# Patient Record
Sex: MALE | Employment: FULL TIME | URBAN - METROPOLITAN AREA
[De-identification: names, ages, dates, MRNs, and addresses within clinical notes are randomized per-mention and may not be internally consistent; named-entity substitution may affect disease eponyms.]

---

## 2017-05-21 ENCOUNTER — COMMUNICATION - HEALTHEAST (OUTPATIENT)
Dept: INTERNAL MEDICINE | Facility: CLINIC | Age: 52
End: 2017-05-21

## 2017-05-29 ENCOUNTER — COMMUNICATION - HEALTHEAST (OUTPATIENT)
Dept: INTERNAL MEDICINE | Facility: CLINIC | Age: 52
End: 2017-05-29

## 2017-08-14 ENCOUNTER — COMMUNICATION - HEALTHEAST (OUTPATIENT)
Dept: INTERNAL MEDICINE | Facility: CLINIC | Age: 52
End: 2017-08-14

## 2017-08-14 DIAGNOSIS — M10.9 GOUT: ICD-10-CM

## 2017-08-14 DIAGNOSIS — I10 HTN (HYPERTENSION): ICD-10-CM

## 2017-08-21 ENCOUNTER — COMMUNICATION - HEALTHEAST (OUTPATIENT)
Dept: INTERNAL MEDICINE | Facility: CLINIC | Age: 52
End: 2017-08-21

## 2017-09-06 ENCOUNTER — OFFICE VISIT (OUTPATIENT)
Dept: SURGERY | Facility: CLINIC | Age: 52
End: 2017-09-06

## 2017-09-06 ENCOUNTER — ALLIED HEALTH/NURSE VISIT (OUTPATIENT)
Dept: SURGERY | Facility: CLINIC | Age: 52
End: 2017-09-06

## 2017-09-06 ENCOUNTER — RECORDS - HEALTHEAST (OUTPATIENT)
Dept: ADMINISTRATIVE | Facility: OTHER | Age: 52
End: 2017-09-06

## 2017-09-06 VITALS
DIASTOLIC BLOOD PRESSURE: 69 MMHG | HEART RATE: 58 BPM | OXYGEN SATURATION: 100 % | BODY MASS INDEX: 36.32 KG/M2 | WEIGHT: 218 LBS | SYSTOLIC BLOOD PRESSURE: 125 MMHG | TEMPERATURE: 98 F | HEIGHT: 65 IN

## 2017-09-06 DIAGNOSIS — Z98.84 BARIATRIC SURGERY STATUS: Primary | ICD-10-CM

## 2017-09-06 ASSESSMENT — PAIN SCALES - GENERAL: PAINLEVEL: NO PAIN (0)

## 2017-09-06 NOTE — MR AVS SNAPSHOT
"              After Visit Summary   9/6/2017    Alvin Fitzpatrick    MRN: [id number]           Patient Information     Date Of Birth          1965        Visit Information        Provider Department      9/6/2017 1:15 PM Kiran Laird MD [facility] Surgical Weight Management        Today's Diagnoses     Bariatric surgery status    -  1       Follow-ups after your visit        Who to contact     Please call your clinic at [phone] to:    Ask questions about your health    Make or cancel appointments    Discuss your medicines    Learn about your test results    Speak to your doctor   If you have compliments or concerns about an experience at your clinic, or if you wish to file a complaint, please contact [facility] Patient Relations at [phone] or email us at [email or web address]         Additional Information About Your Visit        MyChart Information     [employer] gives you secure access to your electronic health record. If you see a primary care provider, you can also send messages to your care team and make appointments. If you have questions, please call your primary care clinic.  If you do not have a primary care provider, please call [phone] and they will assist you.      [employer] is an electronic gateway that provides easy, online access to your medical records. With [employer], you can request a clinic appointment, read your test results, renew a prescription or communicate with your care team.     To access your existing account, please contact your [facility] or call [phone] for assistance.        Care EveryWhere ID     This is your Care EveryWhere ID. This could be used by other organizations to access your Only medical records  [id number]        Your Vitals Were     Pulse Temperature Height Pulse Oximetry BMI (Body Mass Index)       58 98  F (36.7  C) (Oral) 5' 5\" 100% 36.28 kg/m2        Blood Pressure from Last " 3 Encounters:   09/06/17 125/69   10/07/16 115/69   01/27/16 106/66    Weight from Last 3 Encounters:   09/06/17 218 lb   10/07/16 212 lb 1.6 oz   01/27/16 241 lb 6.4 oz              Today, you had the following     No orders found for display       Primary Care Provider Office Phone # Fax #    Noe Beckwith [phone]       [address] [address]        Equal Access to Services     MARILOU SANCHEZ : Hadii aad ku hadasho Soomaali, waaxda luqadaha, qaybta kaalmada adeegyada, federico gallardo . So [facility] [phone].    ATENCIÓN: Si habla español, tiene a bermudez disposición servicios gratuitos de asistencia lingüística. Ll[facility] [phone].    We comply with applicable federal civil rights laws and [place] laws. We do not discriminate on the basis of race, color, national origin, age, disability sex, sexual orientation or gender identity.            Thank you!     Thank you for choosing [facility] SURGICAL WEIGHT MANAGEMENT  for your care. Our goal is always to provide you with excellent care. Hearing back from our patients is one way we can continue to improve our services. Please take a few minutes to complete the written survey that you may receive in the mail after your visit with us. Thank you!             Your Updated Medication List - Protect others around you: Learn how to safely use, store and throw away your medicines at www.disposemymeds.org.          This list is accurate as of: 9/6/17  4:29 PM.  Always use your most recent med list.                   Brand Name Dispense Instructions for use Diagnosis    allopurinol 300 MG tablet    ZYLOPRIM     Take 300 mg by mouth daily        B-12 1000 MCG Subl           CALCIUM CITRATE + PO      Take 1,200-1,500 tablets by mouth        CELEBREX PO           Multi-vitamin Tabs tablet      Take 1 tablet by mouth daily        polyethylene glycol Packet    MIRALAX    7 packet    Take 17 g by mouth daily     Constipation       torsemide 20 MG tablet    DEMADEX     Take 20 mg by mouth daily        TYLENOL PO      Take 325 mg by mouth as needed for mild pain or fever

## 2017-09-06 NOTE — PROGRESS NOTES
Return Bariatric Surgery Note    RE: Alvin Fitzpatrick  MR#: [id number]  : 1965  VISIT DATE: Sep 6, 2017    Dear Noe Beckwith,    I had the pleasure of seeing your patient, Alvin Fitzpatrick, in my post-bariatric surgery assessment clinic.    CHIEF COMPLAINT: Post-bariatric surgery follow-up.    Alvin is a 51 y.o. Male who is now 2 years s/p sleeve gastrectomy. His highest weight was 400, lowest 199, currently 218. His weight has been largely stable over the past year ranging from 205-218. He is slightly worried that his weight has trended up, but feels that this might be from his more extensive weight lifting as of late. He has occasional constipation that he takes Miralax for. Taking vitamins     The patient is otherwise feeling well and is very pleased with his surgical results.    HISTORY OF PRESENT ILLNESS:  Questions Regarding Prior Weight Loss Surgery Reviewed With Patient 2017   I had the following weight loss procedure: Sleeve Gastrectomy   What year was your surgery?    How has your weight changed since your last visit? I have stayed about the same   Are you currently taking any weight loss medications? No   Do you currently have any of the following: Sleep Apnea   Have you been to the Emergency room since your last visit with us? No   Were you in the hospital since your last visit with us? No   Do you have any concerns today? none       Weight History:     2017   What is your highest lifetime weight? 406   What is your lowest weight since surgery? (In pounds) 199        Current Weight: Weight: 218 lb          Questions Regarding Co-Morbidities and Health Concerns Reviewed With Patient 2017   Pre-diabetes: Gone away   Diabetes II: Never   High Blood Pressure: Gone Away   High cholesterol: Gone away   Heartburn/Reflux: Never   Sleep apnea: Improved   Do you use a CPAP? Yes   PCOS: Never   Back pain: Improved   Joint pain: Improved   Lower leg swelling: Improved       Eating  "Habits 8/28/2017   How many meals do you eat per day? 3   Do you snack between meals? Sometimes   How much food are you eating at each meal? 1/2 cup to 1 cup   Are you able to separate your meals and liquids by at least 30 minutes? Yes   Are you able to avoid liquid calories? Yes       Exercise Questions Reviewed With Patient 8/28/2017   How often do you exercise? 3 to 4 times per week   What is the duration of your exercise (in minutes)? 60+ Minutes   What types of exercise do you do? walking, swimming, other   What keeps you from being more active?  I am as active as I can possbily be, Lack of Time       Social History:      8/28/2017   Are you smoking? No   Are you drinking alcohol? No       Medications:  Current Outpatient Prescriptions   Medication     Celecoxib (CELEBREX PO)     multivitamin, therapeutic with minerals (MULTI-VITAMIN) TABS     Calcium Citrate-Vitamin D (CALCIUM CITRATE + PO)     Cyanocobalamin (B-12) 1000 MCG SUBL     allopurinol (ZYLOPRIM) 300 MG tablet     torsemide (DEMADEX) 20 MG tablet     Acetaminophen (TYLENOL PO)     polyethylene glycol (MIRALAX) packet     No current facility-administered medications for this visit.          8/28/2017   Do you avoid NSAIDs such as (Ibuprofen, Aleve, Naproxen, Advil)?   No       ROS:  GI:      8/28/2017   Vomiting: No   Diarrhea: No   Constipation: Yes   Swallowing trouble: No   Abdominal pain: No   Heartburn: No   Rash in skin folds: No   Depression: No   Stress urinary incontinence No     Skin:   BAR RBS ROS - SKIN 8/28/2017   Rash in skin folds: No     Psych:      8/28/2017   Depression: No   Anxiety: No       PHYSICAL EXAMINATION:  /69 (BP Location: Left arm, Patient Position: Chair, Cuff Size: Adult Large)  Pulse 58  Temp 98  F (36.7  C) (Oral)  Ht 5' 5\"  Wt 218 lb  SpO2 100%  BMI 36.28 kg/m2   Gen - sitting upright in chair, pleasant.  Lungs - breathing comfortably on room air  Abd - non-distended. Soft, nt/nd. Small reducible " periumbilical hernia.   Neuro - moving all extremities spontaneously. No focal deficits.     ASSESSMENT AND PLAN:    1. 2 years status post laparoscopic gastric sleeve. Current weight 218lbs down from 400. He has been stable at that weight for several months. Advised patient to continue dietary habits and be mindful of stress management. Dietician saw the patient to discuss fiber. Continue Miralax prn for constipation.    2. Morbid Obesity, current BMI: Body mass index is 36.28 kg/(m^2).  3. Post surgical malabsorption:   Labs at upcoming PCP visit    Follow food plan per dietitian recommendations.   Continue taking recommended post-op vitamins.  4. Umbilical hernia, no need for repair at this time. Discussed with patient that if it begins causing pain or becomes non-reducible, he should present to the ED.   5. Return to clinic in 1 year.      Patient seen and examined and history reviewed.  Doing well after sleeve gastrectomy and is free of complications.  Please see above which reflects the history and physical examination.

## 2017-09-06 NOTE — NURSING NOTE
"Chief Complaint   Patient presents with     Clinic Care Coordination - Follow-up     2 year follow up Bariatric.        Vitals:    09/06/17 1258   BP: 125/69   BP Location: Left arm   Patient Position: Chair   Cuff Size: Adult Large   Pulse: 58   Temp: 98  F (36.7  C)   TempSrc: Oral   SpO2: 100%   Weight: 218 lb   Height: 5' 5\"       Body mass index is 36.28 kg/(m^2).  Dina WANG, OVIDION               "

## 2017-09-06 NOTE — LETTER
2017     RE: Alvin Fitzpatrick  [address] [facility] [address]     Dear Colleague,    Thank you for referring your patient, Alvin Fitzpatrick, to the [facility] SURGICAL WEIGHT MANAGEMENT at [facility]. Please see a copy of my visit note below.        Return Bariatric Surgery Note    RE: Alvin Fitzpatrick  MR#: [id number]  : 1965  VISIT DATE: Sep 6, 2017    Dear Noe Beckwith,    I had the pleasure of seeing your patient, Alvin Fitzpatrick, in my post-bariatric surgery assessment clinic.    CHIEF COMPLAINT: Post-bariatric surgery follow-up.    Alvin is a 51 y.o. Male who is now 2 years s/p sleeve gastrectomy. His highest weight was 400, lowest 199, currently 218. His weight has been largely stable over the past year ranging from 205-218. He is slightly worried that his weight has trended up, but feels that this might be from his more extensive weight lifting as of late. He has occasional constipation that he takes Miralax for. Taking vitamins     The patient is otherwise feeling well and is very pleased with his surgical results.    HISTORY OF PRESENT ILLNESS:  Questions Regarding Prior Weight Loss Surgery Reviewed With Patient 2017   I had the following weight loss procedure: Sleeve Gastrectomy   What year was your surgery? 2015   How has your weight changed since your last visit? I have stayed about the same   Are you currently taking any weight loss medications? No   Do you currently have any of the following: Sleep Apnea   Have you been to the Emergency room since your last visit with us? No   Were you in the hospital since your last visit with us? No   Do you have any concerns today? none       Weight History:     2017   What is your highest lifetime weight? 406   What is your lowest weight since surgery? (In pounds) 199        Current Weight: Weight: 218 lb          Questions Regarding Co-Morbidities and Health Concerns Reviewed With Patient 2017    Pre-diabetes: Gone away   Diabetes II: Never   High Blood Pressure: Gone Away   High cholesterol: Gone away   Heartburn/Reflux: Never   Sleep apnea: Improved   Do you use a CPAP? Yes   PCOS: Never   Back pain: Improved   Joint pain: Improved   Lower leg swelling: Improved       Eating Habits 8/28/2017   How many meals do you eat per day? 3   Do you snack between meals? Sometimes   How much food are you eating at each meal? 1/2 cup to 1 cup   Are you able to separate your meals and liquids by at least 30 minutes? Yes   Are you able to avoid liquid calories? Yes       Exercise Questions Reviewed With Patient 8/28/2017   How often do you exercise? 3 to 4 times per week   What is the duration of your exercise (in minutes)? 60+ Minutes   What types of exercise do you do? walking, swimming, other   What keeps you from being more active?  I am as active as I can possbily be, Lack of Time       Social History:      8/28/2017   Are you smoking? No   Are you drinking alcohol? No       Medications:  Current Outpatient Prescriptions   Medication     Celecoxib (CELEBREX PO)     multivitamin, therapeutic with minerals (MULTI-VITAMIN) TABS     Calcium Citrate-Vitamin D (CALCIUM CITRATE + PO)     Cyanocobalamin (B-12) 1000 MCG SUBL     allopurinol (ZYLOPRIM) 300 MG tablet     torsemide (DEMADEX) 20 MG tablet     Acetaminophen (TYLENOL PO)     polyethylene glycol (MIRALAX) packet     No current facility-administered medications for this visit.          8/28/2017   Do you avoid NSAIDs such as (Ibuprofen, Aleve, Naproxen, Advil)?   No       ROS:  GI:      8/28/2017   Vomiting: No   Diarrhea: No   Constipation: Yes   Swallowing trouble: No   Abdominal pain: No   Heartburn: No   Rash in skin folds: No   Depression: No   Stress urinary incontinence No     Skin:   BAR RBS ROS - SKIN 8/28/2017   Rash in skin folds: No     Psych:      8/28/2017   Depression: No   Anxiety: No       PHYSICAL EXAMINATION:  /69 (BP Location: Left arm,  "Patient Position: Chair, Cuff Size: Adult Large)  Pulse 58  Temp 98  F (36.7  C) (Oral)  Ht 5' 5\"  Wt 218 lb  SpO2 100%  BMI 36.28 kg/m2   Gen - sitting upright in chair, pleasant.  Lungs - breathing comfortably on room air  Abd - non-distended. Soft, nt/nd. Small reducible periumbilical hernia.   Neuro - moving all extremities spontaneously. No focal deficits.     ASSESSMENT AND PLAN:    1. 2 years status post laparoscopic gastric sleeve. Current weight 218lbs down from 400. He has been stable at that weight for several months. Advised patient to continue dietary habits and be mindful of stress management. Dietician saw the patient to discuss fiber. Continue Miralax prn for constipation.    2. Morbid Obesity, current BMI: Body mass index is 36.28 kg/(m^2).  3. Post surgical malabsorption:   Labs at upcoming PCP visit    Follow food plan per dietitian recommendations.   Continue taking recommended post-op vitamins.  4. Umbilical hernia, no need for repair at this time. Discussed with patient that if it begins causing pain or becomes non-reducible, he should present to the ED.   5. Return to clinic in 1 year.    Patient seen and examined and history reviewed.  Doing well after sleeve gastrectomy and is free of complications.  Please see above which reflects the history and physical examination.    Again, thank you for allowing me to participate in the care of your patient.      Sincerely,    Kiran Laird MD    "

## 2017-09-06 NOTE — PROGRESS NOTES
Nutrition Assessment  Reason For Visit:  Alvin Fitzpatrick is a 51 year-old male presenting today for nutrition follow-up, 2 years s/p SG with Dr. Laird.  Pt was referred by Dr. Ahmadi (9/6/17).     Anthropometrics:  Pre-op Weight: 340.9 lbs  Current Weight: 218 lbs with BMI of 36.28  Weight loss: 122.9 lbs    Current Vitamins/Minerals: MVI/minerals BID, 500 mg Calcium Citrate + Vitamin D TID, Vitamin B12 sublingual daily    Nutrition History:  Pt c/o constipation.  Pt is following all diet guidelines post-op SG very well per his report and diet recall today.     Physical Activity: walking and strengthening exercises daily (time variable); utilizing a  here and there for guidance    Nutrition Prescription:  Grams Protein: 60 (minimum)  Amount of Fluid: 48-64 oz    Nutrition Diagnosis  Food and nutrition-related knowledge deficit related to no history of education for prevention of infrequent bowel movements as evidenced by Pt seeking advice on improving bowel movement regularity.    Intervention  Intervention At Appointment:  Materials/Education provided on improving bowel movement regularity through adequate fiber, hydration, movement, and probiotics. Provided Pt with list of goals.     Goals:  1. Continue to focus on lean protein and non-starchy veg/whole fruit at meals.   2. Be sure to drink at least 64 oz of non-calorie fluids between meals.   3. Try a daily probiotic ([email or web address]).   4. Increase movement throughout the day.     Follow-Up: PRN    Time spent with patient: 30 minutes.  Niki Rebollar, MS, RDN, LDN, CLT  Pager: [phone]

## 2017-09-06 NOTE — MR AVS SNAPSHOT
MRN:[id number]                      After Visit Summary   9/6/2017    Alvin Fitzpatrick    MRN: [id number]           Visit Information        Provider Department      9/6/2017 1:45 PM Niki Rebollar RD M [facility] Surgical Weight Management        [employer] Information     [employer] gives you secure access to your electronic health record. If you see a primary care provider, you can also send messages to your care team and make appointments. If you have questions, please call your primary care clinic.  If you do not have a primary care provider, please call [phone] and they will assist you.      [employer] is an electronic gateway that provides easy, online access to your medical records. With [employer], you can request a clinic appointment, read your test results, renew a prescription or communicate with your care team.     To access your existing account, please contact your [facility] or call [phone] for assistance.        Care EveryWhere ID     This is your Care EveryWhere ID. This could be used by other organizations to access your [place] medical records  [id number]        Equal Access to Services     MARILOU SANCHEZ : Hadii zeeshan abreu hadasho Soluzali, waaxda luqadaha, qaybta kaalmada adegeronimo, federico bean. So [facility] [phone].    ATENCIÓN: Si habla español, tiene a bermudez disposición servicios gratuitos de asistencia lingüística. Llame al [phone].    We comply with applicable federal civil rights laws and [place] laws. We do not discriminate on the basis of race, color, national origin, age, disability sex, sexual orientation or gender identity.

## 2017-11-06 ENCOUNTER — COMMUNICATION - HEALTHEAST (OUTPATIENT)
Dept: INTERNAL MEDICINE | Facility: CLINIC | Age: 52
End: 2017-11-06

## 2017-11-06 ENCOUNTER — OFFICE VISIT - HEALTHEAST (OUTPATIENT)
Dept: INTERNAL MEDICINE | Facility: CLINIC | Age: 52
End: 2017-11-06

## 2017-11-06 DIAGNOSIS — M25.511 RIGHT SHOULDER PAIN: ICD-10-CM

## 2017-11-06 DIAGNOSIS — M16.11 OSTEOARTHRITIS OF RIGHT HIP: ICD-10-CM

## 2017-11-06 DIAGNOSIS — I87.2 VENOUS INSUFFICIENCY: ICD-10-CM

## 2017-11-06 DIAGNOSIS — Z98.84 BARIATRIC SURGERY STATUS: ICD-10-CM

## 2017-11-06 DIAGNOSIS — Z00.00 ROUTINE PHYSICAL EXAMINATION: ICD-10-CM

## 2017-11-06 DIAGNOSIS — Z12.11 SCREEN FOR COLON CANCER: ICD-10-CM

## 2017-11-06 DIAGNOSIS — M75.41 SHOULDER IMPINGEMENT SYNDROME, RIGHT: ICD-10-CM

## 2017-11-06 DIAGNOSIS — I10 HTN (HYPERTENSION): ICD-10-CM

## 2017-11-06 DIAGNOSIS — G47.33 OSA ON CPAP: ICD-10-CM

## 2017-11-06 DIAGNOSIS — M10.9 GOUT: ICD-10-CM

## 2017-11-06 DIAGNOSIS — Z23 NEED FOR INFLUENZA VACCINATION: ICD-10-CM

## 2017-11-06 DIAGNOSIS — M25.551 RIGHT HIP PAIN: ICD-10-CM

## 2017-11-06 ASSESSMENT — MIFFLIN-ST. JEOR: SCORE: 1799.59

## 2017-12-03 ENCOUNTER — COMMUNICATION - HEALTHEAST (OUTPATIENT)
Dept: INTERNAL MEDICINE | Facility: CLINIC | Age: 52
End: 2017-12-03

## 2017-12-03 DIAGNOSIS — M16.9 OSTEOARTHRITIS OF HIP: ICD-10-CM

## 2017-12-29 ENCOUNTER — TRANSFERRED RECORDS (OUTPATIENT)
Dept: HEALTH INFORMATION MANAGEMENT | Facility: CLINIC | Age: 52
End: 2017-12-29

## 2018-09-13 ENCOUNTER — COMMUNICATION - HEALTHEAST (OUTPATIENT)
Dept: INTERNAL MEDICINE | Facility: CLINIC | Age: 53
End: 2018-09-13

## 2018-11-20 ENCOUNTER — COMMUNICATION - HEALTHEAST (OUTPATIENT)
Dept: INTERNAL MEDICINE | Facility: CLINIC | Age: 53
End: 2018-11-20

## 2018-11-20 DIAGNOSIS — I10 HTN (HYPERTENSION): ICD-10-CM

## 2018-11-20 DIAGNOSIS — M10.9 GOUT: ICD-10-CM

## 2018-12-20 ENCOUNTER — COMMUNICATION - HEALTHEAST (OUTPATIENT)
Dept: INTERNAL MEDICINE | Facility: CLINIC | Age: 53
End: 2018-12-20

## 2018-12-20 DIAGNOSIS — M16.9 OSTEOARTHRITIS OF HIP: ICD-10-CM

## 2019-01-11 ENCOUNTER — RECORDS - HEALTHEAST (OUTPATIENT)
Dept: ADMINISTRATIVE | Facility: OTHER | Age: 54
End: 2019-01-11

## 2019-01-11 ENCOUNTER — OFFICE VISIT - HEALTHEAST (OUTPATIENT)
Dept: INTERNAL MEDICINE | Facility: CLINIC | Age: 54
End: 2019-01-11

## 2019-01-11 DIAGNOSIS — Z98.84 BARIATRIC SURGERY STATUS: ICD-10-CM

## 2019-01-11 DIAGNOSIS — Z79.1 NSAID LONG-TERM USE: ICD-10-CM

## 2019-01-11 DIAGNOSIS — Z12.11 SCREEN FOR COLON CANCER: ICD-10-CM

## 2019-01-11 DIAGNOSIS — M16.11 PRIMARY OSTEOARTHRITIS OF RIGHT HIP: ICD-10-CM

## 2019-01-11 DIAGNOSIS — G47.33 OSA ON CPAP: ICD-10-CM

## 2019-01-11 DIAGNOSIS — M1A.09X0 CHRONIC GOUT OF MULTIPLE SITES, UNSPECIFIED CAUSE: ICD-10-CM

## 2019-01-11 DIAGNOSIS — Z87.891 FORMER SMOKER: ICD-10-CM

## 2019-01-11 DIAGNOSIS — I87.2 VENOUS INSUFFICIENCY: ICD-10-CM

## 2019-01-11 DIAGNOSIS — Z00.00 ROUTINE PHYSICAL EXAMINATION: ICD-10-CM

## 2019-01-11 DIAGNOSIS — R01.1 HEART MURMUR: ICD-10-CM

## 2019-01-11 DIAGNOSIS — I10 ESSENTIAL HYPERTENSION: ICD-10-CM

## 2019-01-11 DIAGNOSIS — E66.01 MORBID OBESITY (H): ICD-10-CM

## 2019-01-11 LAB
ALBUMIN SERPL-MCNC: 4 G/DL (ref 3.5–5)
ALP SERPL-CCNC: 79 U/L (ref 45–120)
ALT SERPL W P-5'-P-CCNC: 13 U/L (ref 0–45)
ANION GAP SERPL CALCULATED.3IONS-SCNC: 13 MMOL/L (ref 5–18)
AST SERPL W P-5'-P-CCNC: 22 U/L (ref 0–40)
BILIRUB SERPL-MCNC: 1 MG/DL (ref 0–1)
BNP SERPL-MCNC: 14 PG/ML (ref 0–43)
BUN SERPL-MCNC: 15 MG/DL (ref 8–22)
CALCIUM SERPL-MCNC: 9.6 MG/DL (ref 8.5–10.5)
CHLORIDE BLD-SCNC: 98 MMOL/L (ref 98–107)
CO2 SERPL-SCNC: 30 MMOL/L (ref 22–31)
CREAT SERPL-MCNC: 0.88 MG/DL (ref 0.7–1.3)
ERYTHROCYTE [DISTWIDTH] IN BLOOD BY AUTOMATED COUNT: 11 % (ref 11–14.5)
FERRITIN SERPL-MCNC: 481 NG/ML (ref 27–300)
GFR SERPL CREATININE-BSD FRML MDRD: >60 ML/MIN/1.73M2
GLUCOSE BLD-MCNC: 87 MG/DL (ref 70–125)
HCT VFR BLD AUTO: 43.8 % (ref 40–54)
HGB BLD-MCNC: 14.5 G/DL (ref 14–18)
IRON SATN MFR SERPL: 59 % (ref 20–50)
IRON SERPL-MCNC: 185 UG/DL (ref 42–175)
MCH RBC QN AUTO: 33.5 PG (ref 27–34)
MCHC RBC AUTO-ENTMCNC: 33.2 G/DL (ref 32–36)
MCV RBC AUTO: 101 FL (ref 80–100)
PLATELET # BLD AUTO: 161 THOU/UL (ref 140–440)
PMV BLD AUTO: 7.8 FL (ref 7–10)
POTASSIUM BLD-SCNC: 4.2 MMOL/L (ref 3.5–5)
PROT SERPL-MCNC: 7.6 G/DL (ref 6–8)
RBC # BLD AUTO: 4.34 MILL/UL (ref 4.4–6.2)
SODIUM SERPL-SCNC: 141 MMOL/L (ref 136–145)
TIBC SERPL-MCNC: 313 UG/DL (ref 313–563)
TRANSFERRIN SERPL-MCNC: 250 MG/DL (ref 212–360)
URATE SERPL-MCNC: 6.4 MG/DL (ref 3–8)
VIT B12 SERPL-MCNC: >2000 PG/ML (ref 213–816)
WBC: 4.9 THOU/UL (ref 4–11)

## 2019-01-11 ASSESSMENT — MIFFLIN-ST. JEOR: SCORE: 1851.99

## 2019-02-20 ENCOUNTER — COMMUNICATION - HEALTHEAST (OUTPATIENT)
Dept: INTERNAL MEDICINE | Facility: CLINIC | Age: 54
End: 2019-02-20

## 2019-02-20 DIAGNOSIS — M10.9 GOUT: ICD-10-CM

## 2019-02-20 DIAGNOSIS — I10 HTN (HYPERTENSION): ICD-10-CM

## 2019-03-11 ENCOUNTER — RECORDS - HEALTHEAST (OUTPATIENT)
Dept: ADMINISTRATIVE | Facility: OTHER | Age: 54
End: 2019-03-11

## 2019-04-11 ENCOUNTER — COMMUNICATION - HEALTHEAST (OUTPATIENT)
Dept: INTERNAL MEDICINE | Facility: CLINIC | Age: 54
End: 2019-04-11

## 2019-04-22 ENCOUNTER — OFFICE VISIT - HEALTHEAST (OUTPATIENT)
Dept: INTERNAL MEDICINE | Facility: CLINIC | Age: 54
End: 2019-04-22

## 2019-04-22 DIAGNOSIS — M1A.09X0 CHRONIC GOUT OF MULTIPLE SITES, UNSPECIFIED CAUSE: ICD-10-CM

## 2019-04-22 DIAGNOSIS — I10 ESSENTIAL HYPERTENSION: ICD-10-CM

## 2019-04-22 DIAGNOSIS — I87.2 VENOUS INSUFFICIENCY: ICD-10-CM

## 2019-04-22 DIAGNOSIS — M16.11 PRIMARY OSTEOARTHRITIS OF RIGHT HIP: ICD-10-CM

## 2019-04-22 DIAGNOSIS — Z98.84 BARIATRIC SURGERY STATUS: ICD-10-CM

## 2019-04-22 DIAGNOSIS — Z53.20 COLONOSCOPY REFUSED: ICD-10-CM

## 2019-04-22 DIAGNOSIS — Z01.810 PREOPERATIVE CARDIOVASCULAR EXAMINATION: ICD-10-CM

## 2019-04-22 DIAGNOSIS — G47.33 OSA ON CPAP: ICD-10-CM

## 2019-04-22 DIAGNOSIS — Z79.1 NSAID LONG-TERM USE: ICD-10-CM

## 2019-04-22 DIAGNOSIS — E66.01 MORBID OBESITY (H): ICD-10-CM

## 2019-04-22 LAB
ANION GAP SERPL CALCULATED.3IONS-SCNC: 11 MMOL/L (ref 5–18)
BUN SERPL-MCNC: 21 MG/DL (ref 8–22)
CALCIUM SERPL-MCNC: 10.1 MG/DL (ref 8.5–10.5)
CHLORIDE BLD-SCNC: 100 MMOL/L (ref 98–107)
CO2 SERPL-SCNC: 30 MMOL/L (ref 22–31)
CREAT SERPL-MCNC: 0.8 MG/DL (ref 0.7–1.3)
ERYTHROCYTE [DISTWIDTH] IN BLOOD BY AUTOMATED COUNT: 11.3 % (ref 11–14.5)
GFR SERPL CREATININE-BSD FRML MDRD: >60 ML/MIN/1.73M2
GLUCOSE BLD-MCNC: 90 MG/DL (ref 70–125)
HCT VFR BLD AUTO: 44.1 % (ref 40–54)
HGB BLD-MCNC: 14.8 G/DL (ref 14–18)
MCH RBC QN AUTO: 34.3 PG (ref 27–34)
MCHC RBC AUTO-ENTMCNC: 33.5 G/DL (ref 32–36)
MCV RBC AUTO: 102 FL (ref 80–100)
PLATELET # BLD AUTO: 173 THOU/UL (ref 140–440)
PMV BLD AUTO: 8.2 FL (ref 7–10)
POTASSIUM BLD-SCNC: 4.3 MMOL/L (ref 3.5–5)
RBC # BLD AUTO: 4.31 MILL/UL (ref 4.4–6.2)
SODIUM SERPL-SCNC: 141 MMOL/L (ref 136–145)
WBC: 3.7 THOU/UL (ref 4–11)

## 2019-04-22 ASSESSMENT — MIFFLIN-ST. JEOR: SCORE: 1842.57

## 2019-04-23 ENCOUNTER — COMMUNICATION - HEALTHEAST (OUTPATIENT)
Dept: INTERNAL MEDICINE | Facility: CLINIC | Age: 54
End: 2019-04-23

## 2019-04-23 ASSESSMENT — MIFFLIN-ST. JEOR: SCORE: 1842.57

## 2019-04-25 ASSESSMENT — MIFFLIN-ST. JEOR: SCORE: 1850.51

## 2019-04-28 ENCOUNTER — COMMUNICATION - HEALTHEAST (OUTPATIENT)
Dept: INTERNAL MEDICINE | Facility: CLINIC | Age: 54
End: 2019-04-28

## 2019-04-30 ENCOUNTER — COMMUNICATION - HEALTHEAST (OUTPATIENT)
Dept: INTERNAL MEDICINE | Facility: CLINIC | Age: 54
End: 2019-04-30

## 2019-05-01 ENCOUNTER — ANESTHESIA - HEALTHEAST (OUTPATIENT)
Dept: SURGERY | Facility: CLINIC | Age: 54
End: 2019-05-01

## 2019-05-01 ENCOUNTER — SURGERY - HEALTHEAST (OUTPATIENT)
Dept: SURGERY | Facility: CLINIC | Age: 54
End: 2019-05-01

## 2019-05-01 ASSESSMENT — MIFFLIN-ST. JEOR: SCORE: 1842.8

## 2019-05-02 ASSESSMENT — MIFFLIN-ST. JEOR: SCORE: 1842.8

## 2019-05-13 ENCOUNTER — RECORDS - HEALTHEAST (OUTPATIENT)
Dept: ADMINISTRATIVE | Facility: OTHER | Age: 54
End: 2019-05-13

## 2019-06-03 ENCOUNTER — RECORDS - HEALTHEAST (OUTPATIENT)
Dept: ADMINISTRATIVE | Facility: OTHER | Age: 54
End: 2019-06-03

## 2019-06-10 ENCOUNTER — RECORDS - HEALTHEAST (OUTPATIENT)
Dept: ADMINISTRATIVE | Facility: OTHER | Age: 54
End: 2019-06-10

## 2019-09-13 ENCOUNTER — RECORDS - HEALTHEAST (OUTPATIENT)
Dept: ADMINISTRATIVE | Facility: OTHER | Age: 54
End: 2019-09-13

## 2019-12-20 ENCOUNTER — RECORDS - HEALTHEAST (OUTPATIENT)
Dept: ADMINISTRATIVE | Facility: OTHER | Age: 54
End: 2019-12-20

## 2020-01-07 ENCOUNTER — COMMUNICATION - HEALTHEAST (OUTPATIENT)
Dept: INTERNAL MEDICINE | Facility: CLINIC | Age: 55
End: 2020-01-07

## 2020-01-07 DIAGNOSIS — M16.9 OSTEOARTHRITIS OF HIP: ICD-10-CM

## 2020-02-10 ENCOUNTER — OFFICE VISIT - HEALTHEAST (OUTPATIENT)
Dept: INTERNAL MEDICINE | Facility: CLINIC | Age: 55
End: 2020-02-10

## 2020-02-10 DIAGNOSIS — M25.512 BILATERAL SHOULDER PAIN, UNSPECIFIED CHRONICITY: ICD-10-CM

## 2020-02-10 DIAGNOSIS — Z12.11 SCREEN FOR COLON CANCER: ICD-10-CM

## 2020-02-10 DIAGNOSIS — Z98.84 BARIATRIC SURGERY STATUS: ICD-10-CM

## 2020-02-10 DIAGNOSIS — I87.2 VENOUS INSUFFICIENCY: ICD-10-CM

## 2020-02-10 DIAGNOSIS — G47.33 OSA ON CPAP: ICD-10-CM

## 2020-02-10 DIAGNOSIS — M1A.09X0 CHRONIC GOUT OF MULTIPLE SITES, UNSPECIFIED CAUSE: ICD-10-CM

## 2020-02-10 DIAGNOSIS — R01.1 HEART MURMUR: ICD-10-CM

## 2020-02-10 DIAGNOSIS — M25.511 BILATERAL SHOULDER PAIN, UNSPECIFIED CHRONICITY: ICD-10-CM

## 2020-02-10 DIAGNOSIS — M16.11 PRIMARY OSTEOARTHRITIS OF RIGHT HIP: ICD-10-CM

## 2020-02-10 DIAGNOSIS — Z00.00 ROUTINE PHYSICAL EXAMINATION: ICD-10-CM

## 2020-02-10 DIAGNOSIS — Z79.1 NSAID LONG-TERM USE: ICD-10-CM

## 2020-02-10 DIAGNOSIS — Z87.891 FORMER SMOKER: ICD-10-CM

## 2020-02-10 DIAGNOSIS — K08.89 PAIN, DENTAL: ICD-10-CM

## 2020-02-10 DIAGNOSIS — E66.01 MORBID OBESITY (H): ICD-10-CM

## 2020-02-10 DIAGNOSIS — I10 ESSENTIAL HYPERTENSION: ICD-10-CM

## 2020-02-10 LAB
ANION GAP SERPL CALCULATED.3IONS-SCNC: 11 MMOL/L (ref 5–18)
BUN SERPL-MCNC: 20 MG/DL (ref 8–22)
CALCIUM SERPL-MCNC: 10.2 MG/DL (ref 8.5–10.5)
CHLORIDE BLD-SCNC: 98 MMOL/L (ref 98–107)
CHOLEST SERPL-MCNC: 203 MG/DL
CO2 SERPL-SCNC: 28 MMOL/L (ref 22–31)
CREAT SERPL-MCNC: 0.8 MG/DL (ref 0.7–1.3)
ERYTHROCYTE [DISTWIDTH] IN BLOOD BY AUTOMATED COUNT: 12.7 % (ref 11–14.5)
FASTING STATUS PATIENT QL REPORTED: YES
GFR SERPL CREATININE-BSD FRML MDRD: >60 ML/MIN/1.73M2
GLUCOSE BLD-MCNC: 86 MG/DL (ref 70–125)
HCT VFR BLD AUTO: 45.7 % (ref 40–54)
HDLC SERPL-MCNC: 81 MG/DL
HGB BLD-MCNC: 14.7 G/DL (ref 14–18)
LDLC SERPL CALC-MCNC: 107 MG/DL
MCH RBC QN AUTO: 33.5 PG (ref 27–34)
MCHC RBC AUTO-ENTMCNC: 32.2 G/DL (ref 32–36)
MCV RBC AUTO: 104 FL (ref 80–100)
PLATELET # BLD AUTO: 234 THOU/UL (ref 140–440)
PMV BLD AUTO: 10.4 FL (ref 8.5–12.5)
POTASSIUM BLD-SCNC: 4.9 MMOL/L (ref 3.5–5)
RBC # BLD AUTO: 4.39 MILL/UL (ref 4.4–6.2)
SODIUM SERPL-SCNC: 137 MMOL/L (ref 136–145)
TRIGL SERPL-MCNC: 75 MG/DL
URATE SERPL-MCNC: 6.3 MG/DL (ref 3–8)
WBC: 4.8 THOU/UL (ref 4–11)

## 2020-02-10 ASSESSMENT — MIFFLIN-ST. JEOR: SCORE: 1863.21

## 2020-03-10 ENCOUNTER — HEALTH MAINTENANCE LETTER (OUTPATIENT)
Age: 55
End: 2020-03-10

## 2020-03-21 ENCOUNTER — COMMUNICATION - HEALTHEAST (OUTPATIENT)
Dept: INTERNAL MEDICINE | Facility: CLINIC | Age: 55
End: 2020-03-21

## 2020-03-21 DIAGNOSIS — M10.9 GOUT: ICD-10-CM

## 2020-06-11 ENCOUNTER — COMMUNICATION - HEALTHEAST (OUTPATIENT)
Dept: INTERNAL MEDICINE | Facility: CLINIC | Age: 55
End: 2020-06-11

## 2020-06-11 DIAGNOSIS — I10 HTN (HYPERTENSION): ICD-10-CM

## 2020-07-08 ENCOUNTER — COMMUNICATION - HEALTHEAST (OUTPATIENT)
Dept: INTERNAL MEDICINE | Facility: CLINIC | Age: 55
End: 2020-07-08

## 2020-07-08 DIAGNOSIS — I10 HTN (HYPERTENSION): ICD-10-CM

## 2020-08-03 ENCOUNTER — COMMUNICATION - HEALTHEAST (OUTPATIENT)
Dept: INTERNAL MEDICINE | Facility: CLINIC | Age: 55
End: 2020-08-03

## 2020-11-16 ENCOUNTER — VIRTUAL VISIT (OUTPATIENT)
Dept: FAMILY MEDICINE | Facility: OTHER | Age: 55
End: 2020-11-16

## 2020-11-16 ENCOUNTER — AMBULATORY - HEALTHEAST (OUTPATIENT)
Dept: FAMILY MEDICINE | Facility: CLINIC | Age: 55
End: 2020-11-16

## 2020-11-16 DIAGNOSIS — Z20.822 SUSPECTED COVID-19 VIRUS INFECTION: ICD-10-CM

## 2020-11-16 NOTE — PROGRESS NOTES
"Date: 2020 11:38:34  Clinician: Guanako Newby  Clinician NPI: [id number]  Patient: Alvin Fitzpatrick  Patient : 1965  Patient Address: [address] [place] [address]  Patient Phone: [phone]  Visit Protocol: URI  Patient Summary:  Alvin is a 55 year old ( : 1965 ) male who initiated a [facility] Visit for COVID-19 (Coronavirus) evaluation and screening. When asked the question \"Please sign me up to receive news, health information and promotions. \", Alvin responded \"No\".    Alvin states his symptoms started suddenly 5-6 days ago.   His symptoms consist of rhinitis and diarrhea.   Symptom details   Nasal secretions: The color of his mucus is clear.   Alvin denies having vomiting, facial pain or pressure, myalgias, chills, malaise, sore throat, teeth pain, ageusia, ear pain, headache, wheezing, fever, cough, nasal congestion, nausea, and anosmia. He also denies taking antibiotic medication in the past month, having recent facial or sinus surgery in the past 60 days, and double sickening (worsening symptoms after initial improvement). He is not experiencing dyspnea.    Pertinent COVID-19 (Coronavirus) information  Alvin does not work or volunteer as healthcare worker or a . In the past 14 days, Alvin has not worked or volunteered at a healthcare facility or group living setting.   In the past 14 days, he also has not lived in a congregate living setting.   Alvin has had a close contact with a laboratory-confirmed COVID-19 patient within 14 days of symptom onset. He was not exposed at his work. Date Alvin was exposed to the laboratory-confirmed COVID-19 patient: 11/10/2020   Additional information about contact with COVID-19 (Coronavirus) patient as reported by the patient (free text): [facility]. Child has tested positive on 11/15. My daughter will be tested today under doctor directive and advised me to also be tested due to the " exposure.    Since December 2019, Alvin has not been tested for COVID-19 and has had upper respiratory infection (URI) or influenza-like illness.      Date(s) of previous URI or influenza-like illness (free-text): February 2020     Symptoms Alvin experienced during previous URI or influenza-like illness as reported by the patient (free-text): congestion, cough, minor fever, fatigue, typical cold/flu symptoms        Pertinent medical history  Alvin needs a return to work/school note.   Weight: 240 lbs   Alvin does not smoke or use smokeless tobacco.   Weight: 240 lbs    MEDICATIONS: allopurinol oral, torsemide oral, celecoxib oral, Multiple Vitamin-Minerals oral, calcium cit-mag-D3-Zn-copper-Mn-boron oral, vitamin B12-folic acid sublingual, ALLERGIES: NKDA  Clinician Response:  Dear Alvin,   Based on your exposure to COVID-19 (coronavirus), we would like to test you for this virus.  1. Please call [phone] to schedule your visit. Explain that you were referred by [facility] to have a COVID-19 test. Be ready to share your [facility] visit ID number.  * If you need to schedule in [facility] please call [phone] or for [employer] employees please call [phone].   * If you need to schedule in the [place] area please call [phone]. [employer] employees call [phone].   The following will serve as your written order for this COVID Test, ordered by me, for the indication of suspected COVID [Z20.828]: The test will be ordered in [employer], our electronic health record, after you are scheduled. It will show as ordered and authorized by Manny Duggan MD.  Order: COVID-19 (coronavirus) PCR for ASYMPTOMATIC EXPOSURE testing from [facility].   If you know you have had close contact with someone who tested positive, you should be quarantined for 14 days after this exposure. You should stay in quarantine for the14 days even if the covid test is negative, the optimal time to test after exposure is 5-7 days from the exposure   Quarantine means   What should I do?  For safety, it's very important to follow these rules. Do this for 14 days after the date you were last exposed to the virus..  Stay home and away from others. Don't go to school or anywhere else. Generally quarantine means staying home from work but there are some exceptions to this. Please contact your workplace.   No hugging, kissing or shaking hands.  Don't let anyone visit.  Cover your mouth and nose with a mask, tissue or washcloth to avoid spreading germs.  Wash your hands and face often. Use soap and water.  What are the symptoms of COVID-19?  The most common symptoms are cough, fever and trouble breathing. Less common symptoms include headache, body aches, fatigue (feeling very tired), chills, sore throat, stuffy or runny nose, diarrhea (loose poop), loss of taste or smell, belly pain, and nausea or vomiting (feeling sick to your stomach or throwing up).  After 14 days, if you have still don't have symptoms, you likely don't have this virus.  If you develop symptoms, follow these guidelines.  If you're normally healthy: Please start another [facility] visit to report your symptoms. Go to [email or web address].  If you have a serious health problem (like cancer, heart failure, an organ transplant or kidney disease): Call your specialty clinic. Let them know that you might have COVID-19.  2. When it's time for your COVID test:  Stay at least 6 feet away from others. (If someone will drive you to your test, stay in the backseat, as far away from the  as you can.)  Cover your mouth and nose with a mask, tissue or washcloth.  Go straight to the testing site. Don't make any stops on the way there or back.  Please note  Caregivers in these groups are at risk for severe illness due to COVID-19:  o People 65 years and older  o People who live in a nursing home or long-term care facility  o People with chronic disease (lung, heart, cancer, diabetes, kidney, liver, immunologic)  o People  who have a weakened immune system, including those who:  Are in cancer treatment  Take medicine that weakens the immune system, such as corticosteroids  Had a bone marrow or organ transplant  Have an immune deficiency  Have poorly controlled HIV or AIDS  Are obese (body mass index of 40 or higher)  Smoke regularly  Where can I get more information?  [facility] -- About COVID-19: [email or web address]  CDC -- What to Do If You're Sick: www.cdc.gov/coronavirus/2019-ncov/about/steps-when-sick.html  CDC -- Ending Home Isolation: www.cdc.gov/coronavirus/2019-ncov/hcp/disposition-in-home-patients.html  [facility] -- Caring for Someone: www.cdc.gov/coronavirus/2019-ncov/if-you-are-sick/care-for-someone.html  [facility] -- Interim Guidance for Hospital Discharge to Home: www.health.[facility].[place].us/diseases/coronavirus/hcp/hospdischarge.pdf  [facility] clinical trials (COVID-19 research studies): clinicalaffairs.[facility]-clinical-trials  Below are the COVID-19 hotlines at the [place] Department of Health ([facility]). Interpreters are available.  For health questions: Call 625-237-2114 or 1-854.556.6691 (7 a.m. to 7 p.m.)  For questions about schools and childcare: Call 665-084-8981 or 1-431.138.5531 (7 a.m. to 7 p.m.)    Diagnosis: Contact with and (suspected) exposure to other viral communicable diseases  Diagnosis ICD: Z20.828

## 2020-11-17 ENCOUNTER — AMBULATORY - HEALTHEAST (OUTPATIENT)
Dept: FAMILY MEDICINE | Facility: CLINIC | Age: 55
End: 2020-11-17

## 2020-11-17 DIAGNOSIS — Z20.822 SUSPECTED COVID-19 VIRUS INFECTION: ICD-10-CM

## 2020-11-19 ENCOUNTER — COMMUNICATION - HEALTHEAST (OUTPATIENT)
Dept: SCHEDULING | Facility: CLINIC | Age: 55
End: 2020-11-19

## 2020-12-08 ENCOUNTER — COMMUNICATION - HEALTHEAST (OUTPATIENT)
Dept: INTERNAL MEDICINE | Facility: CLINIC | Age: 55
End: 2020-12-08

## 2020-12-20 ENCOUNTER — HEALTH MAINTENANCE LETTER (OUTPATIENT)
Age: 55
End: 2020-12-20

## 2020-12-22 ENCOUNTER — OFFICE VISIT - HEALTHEAST (OUTPATIENT)
Dept: INTERNAL MEDICINE | Facility: CLINIC | Age: 55
End: 2020-12-22

## 2020-12-22 DIAGNOSIS — I87.2 VENOUS INSUFFICIENCY: ICD-10-CM

## 2020-12-22 DIAGNOSIS — M1A.09X0 CHRONIC GOUT OF MULTIPLE SITES, UNSPECIFIED CAUSE: ICD-10-CM

## 2020-12-22 DIAGNOSIS — Z28.21 INFLUENZA VACCINATION DECLINED: ICD-10-CM

## 2020-12-22 DIAGNOSIS — R01.1 HEART MURMUR: ICD-10-CM

## 2020-12-22 DIAGNOSIS — I10 ESSENTIAL HYPERTENSION: ICD-10-CM

## 2020-12-22 DIAGNOSIS — E66.01 MORBID OBESITY (H): ICD-10-CM

## 2020-12-22 DIAGNOSIS — Z01.810 PREOPERATIVE CARDIOVASCULAR EXAMINATION: ICD-10-CM

## 2020-12-22 DIAGNOSIS — H25.13 NUCLEAR SENILE CATARACT, BILATERAL: ICD-10-CM

## 2020-12-22 DIAGNOSIS — G47.33 OSA ON CPAP: ICD-10-CM

## 2020-12-22 DIAGNOSIS — Z98.84 BARIATRIC SURGERY STATUS: ICD-10-CM

## 2020-12-22 ASSESSMENT — MIFFLIN-ST. JEOR: SCORE: 1823.41

## 2020-12-30 ENCOUNTER — TRANSFERRED RECORDS (OUTPATIENT)
Dept: HEALTH INFORMATION MANAGEMENT | Facility: CLINIC | Age: 55
End: 2020-12-30

## 2020-12-30 ENCOUNTER — RECORDS - HEALTHEAST (OUTPATIENT)
Dept: ADMINISTRATIVE | Facility: OTHER | Age: 55
End: 2020-12-30

## 2021-01-18 ENCOUNTER — COMMUNICATION - HEALTHEAST (OUTPATIENT)
Dept: INTERNAL MEDICINE | Facility: CLINIC | Age: 56
End: 2021-01-18

## 2021-01-18 DIAGNOSIS — I10 HTN (HYPERTENSION): ICD-10-CM

## 2021-01-18 DIAGNOSIS — M16.9 OSTEOARTHRITIS OF HIP: ICD-10-CM

## 2021-02-15 ENCOUNTER — OFFICE VISIT - HEALTHEAST (OUTPATIENT)
Dept: INTERNAL MEDICINE | Facility: CLINIC | Age: 56
End: 2021-02-15

## 2021-02-15 ENCOUNTER — AMBULATORY - HEALTHEAST (OUTPATIENT)
Dept: LAB | Facility: CLINIC | Age: 56
End: 2021-02-15

## 2021-02-15 DIAGNOSIS — Z53.20 COLONOSCOPY REFUSED: ICD-10-CM

## 2021-02-15 DIAGNOSIS — M25.512 CHRONIC PAIN OF BOTH SHOULDERS: ICD-10-CM

## 2021-02-15 DIAGNOSIS — G89.29 CHRONIC PAIN OF BOTH SHOULDERS: ICD-10-CM

## 2021-02-15 DIAGNOSIS — I87.2 VENOUS INSUFFICIENCY: ICD-10-CM

## 2021-02-15 DIAGNOSIS — E66.01 MORBID OBESITY (H): ICD-10-CM

## 2021-02-15 DIAGNOSIS — Z98.84 BARIATRIC SURGERY STATUS: ICD-10-CM

## 2021-02-15 DIAGNOSIS — Z12.5 SCREENING FOR PROSTATE CANCER: ICD-10-CM

## 2021-02-15 DIAGNOSIS — I10 ESSENTIAL HYPERTENSION: ICD-10-CM

## 2021-02-15 DIAGNOSIS — I87.8 VENOUS STASIS OF BOTH LOWER EXTREMITIES: ICD-10-CM

## 2021-02-15 DIAGNOSIS — Z91.89 AT RISK FOR ANEMIA: ICD-10-CM

## 2021-02-15 DIAGNOSIS — M25.511 CHRONIC PAIN OF BOTH SHOULDERS: ICD-10-CM

## 2021-02-15 DIAGNOSIS — M10.9 GOUT, UNSPECIFIED CAUSE, UNSPECIFIED CHRONICITY, UNSPECIFIED SITE: ICD-10-CM

## 2021-02-15 DIAGNOSIS — G47.33 OSA ON CPAP: ICD-10-CM

## 2021-02-15 DIAGNOSIS — Z79.1 NSAID LONG-TERM USE: ICD-10-CM

## 2021-02-15 DIAGNOSIS — Z00.00 ROUTINE PHYSICAL EXAMINATION: ICD-10-CM

## 2021-02-15 LAB
ANION GAP SERPL CALCULATED.3IONS-SCNC: 12 MMOL/L (ref 5–18)
BUN SERPL-MCNC: 21 MG/DL (ref 8–22)
CALCIUM SERPL-MCNC: 9.6 MG/DL (ref 8.5–10.5)
CHLORIDE BLD-SCNC: 99 MMOL/L (ref 98–107)
CO2 SERPL-SCNC: 31 MMOL/L (ref 22–31)
CREAT SERPL-MCNC: 0.73 MG/DL (ref 0.7–1.3)
ERYTHROCYTE [DISTWIDTH] IN BLOOD BY AUTOMATED COUNT: 12.2 % (ref 11–14.5)
FERRITIN SERPL-MCNC: 391 NG/ML (ref 27–300)
GFR SERPL CREATININE-BSD FRML MDRD: >60 ML/MIN/1.73M2
GLUCOSE BLD-MCNC: 75 MG/DL (ref 70–125)
HCT VFR BLD AUTO: 43.5 % (ref 40–54)
HGB BLD-MCNC: 14.9 G/DL (ref 14–18)
MCH RBC QN AUTO: 34.1 PG (ref 27–34)
MCHC RBC AUTO-ENTMCNC: 34.3 G/DL (ref 32–36)
MCV RBC AUTO: 100 FL (ref 80–100)
PLATELET # BLD AUTO: 214 THOU/UL (ref 140–440)
PMV BLD AUTO: 10.3 FL (ref 7–10)
POTASSIUM BLD-SCNC: 4.3 MMOL/L (ref 3.5–5)
PSA SERPL-MCNC: 0.6 NG/ML (ref 0–3.5)
RBC # BLD AUTO: 4.37 MILL/UL (ref 4.4–6.2)
SODIUM SERPL-SCNC: 142 MMOL/L (ref 136–145)
URATE SERPL-MCNC: 6.9 MG/DL (ref 3–8)
WBC: 4.2 THOU/UL (ref 4–11)

## 2021-02-15 ASSESSMENT — MIFFLIN-ST. JEOR: SCORE: 1836.45

## 2021-04-08 ENCOUNTER — RECORDS - HEALTHEAST (OUTPATIENT)
Dept: ADMINISTRATIVE | Facility: OTHER | Age: 56
End: 2021-04-08

## 2021-04-13 ENCOUNTER — RECORDS - HEALTHEAST (OUTPATIENT)
Dept: ADMINISTRATIVE | Facility: OTHER | Age: 56
End: 2021-04-13

## 2021-04-20 ENCOUNTER — AMBULATORY - HEALTHEAST (OUTPATIENT)
Dept: NURSING | Facility: CLINIC | Age: 56
End: 2021-04-20

## 2021-04-24 ENCOUNTER — HEALTH MAINTENANCE LETTER (OUTPATIENT)
Age: 56
End: 2021-04-24

## 2021-05-11 ENCOUNTER — AMBULATORY - HEALTHEAST (OUTPATIENT)
Dept: NURSING | Facility: CLINIC | Age: 56
End: 2021-05-11

## 2021-05-27 NOTE — TELEPHONE ENCOUNTER
Please call patient -    ______________________________________________________________________     Home phone:  [phone] (home)     Cell phone:   Telephone Information:   Mobile [phone]       Other contacts:  Name Home Phone Work Phone Mobile Phone Relationship Lgl Cr   LEIGH CAMERON [phone] Spouse      ______________________________________________________________________     He needs an appointment for preop for hip surgery.    Please help him to schedule an appointment with ME.    Monday and Friday work best (we have openings tomorrow if this works out).    He should expect a electrocardiogram (EKG) and nonfasting blood work at the visit.    Noe Beckwith MD  [facility]  4/11/2019, 10:23 AM

## 2021-05-28 NOTE — TELEPHONE ENCOUNTER
Test Results  Who is calling?:  [place] from [facility]  Who ordered the test: pcp  Type of test: Other: EKG  Date of test:  4/22/2019  Where was the test performed:  [facility]  What are your questions/concerns?:  OR nurse unable to view EKG. Please advise and call! Surgery for patient is tomorrow.  Okay to leave a detailed message?:  Yes

## 2021-05-28 NOTE — PATIENT INSTRUCTIONS - HE
The new shingles shot (Shingrix) is 2 separate shots  by 2-6 months.    The cost out of pocket is around $390 for the 2 shots, so you might want to see if your insurance covers it or a portion of it prior to having it done.     It is estimated that any person's risk of shingles over their lifetime is around 10-20%.    If you have had the old shingles vaccine (Zostavax), it is about 50% effective, reducing your risk of shingles to about 5-10% over your lifetime.    The new shot is 90% effective, reducing your risk of shingles to about 1-2% over your lifetime.    Because the shot is strong, it is very common to have flu like symptoms for 2-3 days after the shot.  25-50% of patients will have fever, muscle aches or headache.    I believe that whether or not you have this vaccine is your own decision depending on your values and preferences.  The information above I give you to make an informed decision.    Noe Beckwith MD  [facility]    ______________________________________________________________________     You are due to consider colon cancer screening - if you wish to proceed, please let me know.    ______________________________________________________________________     Best wishes on your upcoming surgery.

## 2021-05-28 NOTE — ANESTHESIA PREPROCEDURE EVALUATION
Anesthesia Evaluation      Patient summary reviewed   No history of anesthetic complications     Airway   Mallampati: III  Neck ROM: full   Pulmonary - normal exam   (+) sleep apnea on CPAP, ,                          Cardiovascular - normal exam  (+) hypertension, ,      Neuro/Psych - negative ROS     Endo/Other    (+) arthritis, obesity,      GI/Hepatic/Renal - negative ROS           Dental - normal exam                        Anesthesia Plan  Planned anesthetic: spinal    ASA 3     Anesthetic plan and risks discussed with: patient    Post-op plan: routine recovery

## 2021-05-28 NOTE — TELEPHONE ENCOUNTER
Georgia had already seen Dr. Beckwith's message.    She states she let the nurses know and thanked for the call.

## 2021-05-28 NOTE — ANESTHESIA CARE TRANSFER NOTE
Last vitals:   Vitals:    05/01/19 1241   BP: 127/72   Pulse: 78   Resp: 16   Temp: 36.3  C (97.4  F)   SpO2: 100%     Patient's level of consciousness is drowsy  Spontaneous respirations: yes  Maintains airway independently: yes  Dentition unchanged: yes  Oropharynx: oropharynx clear of all foreign objects    QCDR Measures:  ASA# 20 - Surgical Safety Checklist: WHO surgical safety checklist completed prior to induction    PQRS# 430 - Adult PONV Prevention: 4558F - Pt received => 2 anti-emetic agents (different classes) preop & intraop  ASA# 8 - Peds PONV Prevention: NA - Not pediatric patient, not GA or 2 or more risk factors NOT present  PQRS# 424 - Nava-op Temp Management: 4559F - At least one body temp DOCUMENTED => 35.5C or 95.9F within required timeframe  PQRS# 426 - PACU Transfer Protocol: - Transfer of care checklist used  ASA# 14 - Acute Post-op Pain: ASA14B - Patient did NOT experience pain >= 7 out of 10

## 2021-05-28 NOTE — ANESTHESIA PROCEDURE NOTES
Spinal Block    Patient location during procedure: OR  Start time: 5/1/2019 11:00 AM  End time: 5/1/2019 11:04 AM  Reason for block: primary anesthetic    Staffing:  Performing  Anesthesiologist: Harjeet Snyder MD    Preanesthetic Checklist  Completed: patient identified, risks, benefits, and alternatives discussed, timeout performed, consent obtained, airway assessed, oxygen available, suction available, emergency drugs available and hand hygiene performed  Spinal Block  Patient position: sitting  Prep: ChloraPrep  Patient monitoring: heart rate, cardiac monitor, continuous pulse ox and blood pressure  Approach: midline  Location: L3-4  Injection technique: single-shot  Needle type: pencil-tip   Needle gauge: 24 G

## 2021-05-28 NOTE — TELEPHONE ENCOUNTER
No electrocardiogram (EKG) was done as our machines were down at the time of the visit.    He did have a stress test previously and I thought that would be adequate.    They can run a electrocardiogram (EKG) tomorrow if needed.    Noe Beckwith MD  [facility]  4/30/2019, 10:59 AM

## 2021-05-28 NOTE — ANESTHESIA POSTPROCEDURE EVALUATION
Patient: Alvin Fitzpatrick  RIGHT TOTAL HIP ARTHROPLASTY, DIRECT ANTERIOR APPROACH  Anesthesia type: spinal    Patient location: PACU  Last vitals:   Vitals Value Taken Time   /74 5/1/2019  1:30 PM   Temp 36.1  C (97  F) 5/1/2019  1:25 PM   Pulse 61 5/1/2019  1:32 PM   Resp 16 5/1/2019  1:32 PM   SpO2 99 % 5/1/2019  1:32 PM   Vitals shown include unvalidated device data.  Post vital signs: stable  Level of consciousness: awake and responds to simple questions  Post-anesthesia pain: pain controlled  Post-anesthesia nausea and vomiting: no  Pulmonary: unassisted, return to baseline  Cardiovascular: stable and blood pressure at baseline  Hydration: adequate  Anesthetic events: no    QCDR Measures:  ASA# 11 - Nava-op Cardiac Arrest: ASA11B - Patient did NOT experience unanticipated cardiac arrest  ASA# 12 - Nava-op Mortality Rate: ASA12B - Patient did NOT die  ASA# 13 - PACU Re-Intubation Rate: NA - No ETT / LMA used for case  ASA# 10 - Composite Anes Safety: ASA10A - No serious adverse event    Additional Notes:

## 2021-05-31 VITALS — HEIGHT: 65 IN | WEIGHT: 227.9 LBS | BODY MASS INDEX: 37.97 KG/M2

## 2021-06-02 VITALS — WEIGHT: 241.2 LBS | HEIGHT: 65 IN | BODY MASS INDEX: 40.19 KG/M2

## 2021-06-03 VITALS — HEIGHT: 65 IN | WEIGHT: 238.3 LBS | BODY MASS INDEX: 39.7 KG/M2

## 2021-06-03 VITALS — BODY MASS INDEX: 39.99 KG/M2 | HEIGHT: 65 IN | WEIGHT: 240 LBS

## 2021-06-04 VITALS
WEIGHT: 242.8 LBS | HEART RATE: 83 BPM | HEIGHT: 65 IN | OXYGEN SATURATION: 100 % | DIASTOLIC BLOOD PRESSURE: 84 MMHG | SYSTOLIC BLOOD PRESSURE: 136 MMHG | BODY MASS INDEX: 40.45 KG/M2

## 2021-06-05 VITALS
HEIGHT: 65 IN | SYSTOLIC BLOOD PRESSURE: 132 MMHG | OXYGEN SATURATION: 95 % | BODY MASS INDEX: 39.14 KG/M2 | DIASTOLIC BLOOD PRESSURE: 70 MMHG | WEIGHT: 234.9 LBS | TEMPERATURE: 98.1 F | HEART RATE: 85 BPM

## 2021-06-05 VITALS
DIASTOLIC BLOOD PRESSURE: 60 MMHG | HEART RATE: 84 BPM | HEIGHT: 65 IN | OXYGEN SATURATION: 95 % | BODY MASS INDEX: 39.47 KG/M2 | SYSTOLIC BLOOD PRESSURE: 132 MMHG | WEIGHT: 236.9 LBS

## 2021-06-05 NOTE — PROGRESS NOTES
Wt Readings from Last 20 Encounters:   02/10/20 (!) 242 lb 12.8 oz (110.1 kg)   05/02/19 (!) 238 lb 4.8 oz (108.1 kg)   04/22/19 (!) 240 lb (108.9 kg)   01/11/19 (!) 241 lb 3.2 oz (109.4 kg)   11/06/17 (!) 227 lb 14.4 oz (103.4 kg)   09/29/16 211 lb (95.7 kg)   12/14/15 (!) 256 lb (116.1 kg)   12/03/15 (!) 256 lb (116.1 kg)   10/02/15 (!) 272 lb 9.6 oz (123.7 kg)   08/11/15 (!) 294 lb (133.4 kg)   05/04/15 (!) 372 lb (168.7 kg)   04/27/15 (!) 383 lb (173.7 kg)   04/20/15 (!) 383 lb 12 oz (174.1 kg)   04/10/15 (!) 388 lb (176 kg)   04/03/15 (!) 380 lb (172.4 kg)   03/27/15 (!) 379 lb (171.9 kg)   03/23/15 (!) 385 lb (174.6 kg)   03/06/15 (!) 396 lb (179.6 kg)   12/30/14 (!) 402 lb (182.3 kg)

## 2021-06-05 NOTE — PATIENT INSTRUCTIONS - HE
The new shingles shot (Shingrix) is 2 separate shots  by 2-6 months.  It is recommended for people 50 years of age and older.    The cost out of pocket is around $450 for the 2 shots, so you might want to see if your insurance covers it or a portion of it prior to having it done.      It is estimated that any person's risk of shingles over their lifetime is around 10-20%.    The new shot is 90% effective, reducing your risk of shingles to about 1-2% over your lifetime.    Because the shot is strong, it is very common to have flu like symptoms for 2-3 days after the shot.  25-50% of patients will have fever, muscle aches or headache.    I believe that whether or not you have this vaccine is your own decision depending on your values and preferences.  The information above I give you to make an informed decision.    Noe Beckwith MD  [facility]

## 2021-06-07 NOTE — TELEPHONE ENCOUNTER
RN cannot approve Refill Request    RN can NOT refill this medication Protocol failed and NO refill given.    Julieta Boyd, [facility] Triage/Med Refill 3/23/2020    Requested Prescriptions   Pending Prescriptions Disp Refills     allopurinoL (ZYLOPRIM) 300 MG tablet [Pharmacy Med Name: ALLOPURINOL 300MG TABS] 90 tablet 3     Sig: TAKE ONE TABLET BY MOUTH EVERY DAY       Allopurinol/Febuxostat Refill Protocol  Failed - 3/21/2020  4:18 AM        Failed - LFT or AST or ALT in last 12 months     Albumin   Date Value Ref Range Status   01/11/2019 4.0 3.5 - 5.0 g/dL Final     Bilirubin, Total   Date Value Ref Range Status   01/11/2019 1.0 0.0 - 1.0 mg/dL Final     Bilirubin, Direct   Date Value Ref Range Status   12/30/2014 0.2 <=0.5 mg/dL Final     Alkaline Phosphatase   Date Value Ref Range Status   01/11/2019 79 45 - 120 U/L Final     AST   Date Value Ref Range Status   01/11/2019 22 0 - 40 U/L Final     ALT   Date Value Ref Range Status   01/11/2019 13 0 - 45 U/L Final     Protein, Total   Date Value Ref Range Status   01/11/2019 7.6 6.0 - 8.0 g/dL Final                Passed - Visit with PCP or prescribing provider visit in past 12 months     Last office visit with prescriber/PCP: Visit date not found OR same dept: Visit date not found OR same specialty: Visit date not found  Last physical: 2/10/2020 Last MTM visit: Visit date not found   Next visit within 3 mo: Visit date not found  Next physical within 3 mo: Visit date not found  Prescriber OR PCP: Noe Beckwith MD  Last diagnosis associated with med order: 1. Gout  - allopurinoL (ZYLOPRIM) 300 MG tablet [Pharmacy Med Name: ALLOPURINOL 300MG TABS]; TAKE ONE TABLET BY MOUTH EVERY DAY  Dispense: 90 tablet; Refill: 3    If protocol passes may refill for 12 months if within 3 months of last provider visit (or a total of 15 months).

## 2021-06-13 NOTE — PATIENT INSTRUCTIONS - HE
Take your usual medications on the am of surgery with a sip of water.  ______________________________________________________________________      Future Appointments   Date Time Provider Department Center   2/15/2021  8:40 AM Noe Beckwith MD [facility]        ______________________________________________________________________  Our records show that you are due or are coming due for colon cancer screening.  If you choose to have a colonoscopy, you may call [facility] at [phone] to schedule a colonoscopy.  They have multiple sites in the [facility] including [place], [place] and [place].  Other tests can be ordered by me through the clinic.    ______________________________________________________________________     What is cancer screening? -- Colon cancer screening is a way in which doctors check the colon for signs of cancer or growths (called polyps) that might become cancer. It is done in people who have no symptoms and no reason to think they have cancer. The goal is to find and remove polyps before they become cancer, or to find cancer early, before it grows, spreads, or causes problems.    Studies show that having colon cancer screening lowers the chance of dying from colon cancer. There are several different types of screening test that can do this.    ______________________________________________________________________ \    What are the different screening tests for colon cancer? -- They include:    ?Colonoscopy - Colonoscopy allows the doctor to see directly inside the entire colon. Before you can have a colonoscopy, you must clean out your colon. You do this at home by drinking a special liquid that causes watery diarrhea for several hours. On the day of the test, you get medicine to help you relax. Then a doctor puts a thin tube into your anus and advances it into your colon. The tube has a tiny camera attached to it, so the doctor can see inside  your colon. The tube also has tiny tools on the end, so the doctor can remove pieces of tissue or polyps if they are there. After polyps or pieces of tissue are removed, they are sent to a lab to be checked for cancer.     Advantages of this test - Colonoscopy finds most small polyps and almost all large polyps and cancers. If found, polyps can be removed right away. This test gives the most accurate results. If any other screening tests are done first and come back positive (abnormal), a colonoscopy will need to be done for follow-up. If you have a colonoscopy as your first test, you will probably not need a second follow-up test soon after.     Drawbacks to this test - Colonoscopy has some risks. It can cause bleeding or tear the inside of the colon, but this only happens in 1 out of 1,000 people. Also, cleaning out the bowel beforehand can be unpleasant. Plus, people usually cannot work or drive for the rest of the day after the test, because of the relaxation medicine they take during the test.    ?Stool test for blood - Stool tests most commonly check for blood in samples of stool. Cancers and polyps can bleed, and if they bleed around the time you do the stool test, then blood will show up on the test. The test can find even small amounts of blood that you can't see in your stool. Other less serious conditions can also cause small amounts of blood in the stool, and that will show up in this test. You will have to collect small samples from your bowel movements, which you will put in a special container you get from your doctor or nurse. Then you follow the instructions to mail the container out for the testing.     Advantages of this test - This test does not involve cleaning out the colon or having any procedures.     Drawbacks to this test - Stool tests are less likely to find polyps than other screening tests. These tests also often come up abnormal even in people who do not have cancer. If a stool test  shows something abnormal, doctors usually follow up with a colonoscopy.    ?Stool DNA test (COLOGUARD)- The stool DNA test checks for genetic markers of cancer, as well as for signs of blood. For this test, you get a special kit in order to collect a whole bowel movement. Then you follow the instructions about how and where to ship it.     Advantages of this test - This test does not involve cleaning out the colon or having any procedures. When cancer is not present, it is less likely to be falsely abnormal than a stool test for blood. That means it leads to fewer unnecessary colonoscopies.     Drawbacks to this test - It might be unpleasant to collect and ship a whole bowel movement. If a DNA test shows something abnormal, doctors usually follow up with a colonoscopy.    ______________________________________________________________________     How do I choose which test to have? -- Work with your doctor or nurse to decide which test is best for you. Being screened-no matter how-is more important than which test you choose.    Who should be screened for colon cancer? -- Doctors recommend that most people begin having colon cancer screening at age 50.  Most people can stop being screened around the age of 75.    How often should I be screened? -- That depends on your risk of colon cancer and which test you have. People who have a high risk of colon cancer often need to be tested more often and should have a colonoscopy.    Most people are not at high risk, so they can choose one of these schedules:  ?Colonoscopy every 10 years  ?Stool testing for blood once a year  ?Stool DNA testing every 3 years (but doctors are not yet sure of the best time frame for repeating the test)      (adapted from [facility] patient information)    ______________________________________________________________________          The new shingles shot (Shingrix) is 2 separate shots  by 2-6 months.  It is recommended for people 50  years of age and older.    The cost out of pocket is around $450 for the 2 shots, so you might want to see if your insurance covers it or a portion of it prior to having it done.      It is estimated that any person's risk of shingles over their lifetime is around 10-20%.    The new shot is 90% effective, reducing your risk of shingles to about 1-2% over your lifetime.    Because the shot is strong, it is very common to have flu like symptoms for 2-3 days after the shot.  25-50% of patients will have fever, muscle aches or headache.    I believe that whether or not you have this vaccine is your own decision depending on your values and preferences.  The information above I give you to make an informed decision.    Noe Beckwith MD  [facility]

## 2021-06-13 NOTE — TELEPHONE ENCOUNTER
Okay to change to preop.    Notify the patient.    Noe Beckwith MD  General Internal Medicine  [facility]  12/9/2020, 9:47 AM

## 2021-06-13 NOTE — TELEPHONE ENCOUNTER
Upcoming Appointment Question  When is the appointment: 12/22/20  What is your appointment for?: Pre-Op- Physical   Who is your appointment scheduled with?: PCP only  What is your question/concern?: Patient states his appointment was scheduled as physical it should be Pre-op physical he is having eye surgery in January first week .  requesting to change to Pre-Op physical other wise his insurance will not cover . Patient also stated that provider need to send prior authorization for his pre - op physicals  appointment .  Okay to leave a detailed message?: No

## 2021-06-14 NOTE — TELEPHONE ENCOUNTER
RN cannot approve Refill Request    RN can NOT refill this medication med is not covered by policy/route to provider. Last office visit: Visit date not found Last Physical: 12/22/2020 Last MTM visit: Visit date not found Last visit same specialty: Visit date not found.  Next visit within 3 mo: Visit date not found  Next physical within 3 mo: Visit date not found      Julieta Boyd, [facility] Triage/Med Refill 1/18/2021    Requested Prescriptions   Pending Prescriptions Disp Refills     celecoxib (CELEBREX) 200 MG capsule [Pharmacy Med Name: CELECOXIB 200MG CAPS] 180 capsule 3     Sig: TAKE ONE CAPSULE BY MOUTH TWICE A DAY AS DIRECTED       There is no refill protocol information for this order

## 2021-06-15 NOTE — PATIENT INSTRUCTIONS - HE
"You are due or coming due for your next tetanus vaccine.  If you are on Medicare, please check to see if your supplemental insurance covers this vaccine, as Medicare traditionally does not pay for this.    Here is some more information related to tetanus and why we recommend this vaccine:    Patient education: Tetanus (The Basics)    Written by the doctors and editors at [facility]    What is tetanus? -- Tetanus is a serious infection that causes muscle stiffness and spasms. It is sometimes called \"lockjaw\" because muscle spasms can clench the jaw shut.    Tetanus is caused by bacteria (germs) that live in the soil. They can get into the body through a cut or scrape. They can also get into the body if a person uses a needle to inject illegal drugs. Most people in the [place] are protected from these bacteria because they have gotten vaccines against them.    What are the symptoms of tetanus? -- The symptoms include:    ?Stiff jaw or neck muscles, which make it hard to move your jaw or neck normally  ?Strange-looking smile that does not go away when you try to relax your mouth  ?Tight, painful muscles that do not let go when you try to relax them  ?Trouble breathing, swallowing, or both  ?Feeling irritable or restless  ?Sweating even when you are not exercising or hot  ?Heartbeat that is faster than usual, or irregular heartbeat  ?Fever  ?Painful muscle spasms    People who are very sick with tetanus can have muscle spasms that force the body into a \"bridge\" position. They might have:    ?Clenched fists  ?Back arched off the floor or bed  ?Legs stretched out  ?Arms moving back and forth  ?Trouble breathing - They might even stop breathing during a muscle spasm.    Should I see a doctor or nurse? -- See your doctor or nurse right away if:    ?You get a puncture wound, for example from a nail that goes through your skin.  ?You get a cut, scrape, or other injury you cannot clean completely.  ?You have an injury that " leaves something (like a nail or glass) inside your body.  ?An animal bites you.  ?You have diabetes, and get a sore on your foot, leg, or other place.  ?You have a stiff jaw or neck, other tight muscles you cannot relax, or painful muscle spasms.  ?You have trouble breathing or swallowing.    It is especially important to see a doctor or nurse if you get a puncture wound or animal bite and your last tetanus shot was 5 years ago or longer, or if you do not remember getting a tetanus shot.    Is there a test for tetanus? -- No. There is no simple test. But your doctor or nurse should be able to tell if you have it by learning about your symptoms and vaccine history, and by doing an exam. This infection is most likely in people who have had an injury and who have not had the tetanus vaccine at all or not had the right vaccine boosters.    Is tetanus dangerous? -- Yes. People with tetanus need to go to the hospital, and some people even die from it. The muscle spasms can stop your breathing.    How is tetanus treated? -- Doctors treat tetanus in the hospital, sometimes in the intensive care unit (ICU). Treatments include:    ?Cleaning cuts or scrapes to remove skin and tissue that could have tetanus bacteria on it  ?Giving medicines to fight the infection  ?Giving a tetanus vaccine booster  ?Giving medicine and other treatments to reduce muscle spasms, breathing problems, pain, and other symptoms  ?Using a ventilator (breathing machine) if you have trouble breathing on your own  ?Using a feeding tube if you cannot eat or drink on your own  ?Having physical therapy to help muscles recover    Can tetanus be prevented? -- Yes. To reduce your chances of getting tetanus, do these things:    ?Get a tetanus vaccine. This teaches your body how to fight tetanus. Most children growing up in the [place] get this vaccine as part of their routine childhood vaccines.  ?Get regular tetanus booster shots. Adults should get  tetanus booster shots every 10 years. For bad wounds, you will need to get a tetanus booster shot if you haven't had one in the last 5 years. If you have a bad wound and you haven't received all of your tetanus vaccines or you are not sure if you have, you will need a tetanus booster shot and another shot to fight any tetanus bacteria that got in the wound.  ?Wash cuts or scrapes with soap and water and use antibiotic ointment on them. See a doctor or nurse if you cannot get all the dirt out or cannot see all the way into the wound.  ?Do not inject illegal drugs, or at least use clean needles if you do inject drugs or anything else.    ______________________________________________________________________      The new shingles shot (Shingrix) is 2 separate shots  by 2-6 months.    The cost out of pocket is around $450 for the 2 shots, so you might want to see if your insurance covers it or a portion of it prior to having it done.  Often by having it done at a pharmacy rather than a clinic, it can be cheaper for you.    It is estimated that any person's risk of shingles over their lifetime is around 10-20%.    If you have had the old shingles vaccine (Zostavax), it is about 50% effective, reducing your risk of shingles to about 5-10% over your lifetime.    The new shot is 90% effective, reducing your risk of shingles to about 1-2% over your lifetime.    Because the shot is strong, it is very common to have flu like symptoms for 2-3 days after the shot.  25-50% of patients will have fever, muscle aches or headache.    I believe that whether or not you have this vaccine is your own decision depending on your values and preferences.  The information above I give you to make an informed decision.    Noe Beckwith MD  [facility]    ______________________________________________________________________      Our records show that you are due or are coming due for colon cancer screening.  If  you choose to have a colonoscopy, you may call [facility] at [phone] to schedule a colonoscopy.  They have multiple sites in the [facility] area including [place], [place] and [place].  Other tests can be ordered by me through the clinic.    ______________________________________________________________________     What is cancer screening? -- Colon cancer screening is a way in which doctors check the colon for signs of cancer or growths (called polyps) that might become cancer. It is done in people who have no symptoms and no reason to think they have cancer. The goal is to find and remove polyps before they become cancer, or to find cancer early, before it grows, spreads, or causes problems.    Studies show that having colon cancer screening lowers the chance of dying from colon cancer. There are several different types of screening test that can do this.    ______________________________________________________________________ \    What are the different screening tests for colon cancer? -- They include:    ?Colonoscopy - Colonoscopy allows the doctor to see directly inside the entire colon. Before you can have a colonoscopy, you must clean out your colon. You do this at home by drinking a special liquid that causes watery diarrhea for several hours. On the day of the test, you get medicine to help you relax. Then a doctor puts a thin tube into your anus and advances it into your colon. The tube has a tiny camera attached to it, so the doctor can see inside your colon. The tube also has tiny tools on the end, so the doctor can remove pieces of tissue or polyps if they are there. After polyps or pieces of tissue are removed, they are sent to a lab to be checked for cancer.     Advantages of this test - Colonoscopy finds most small polyps and almost all large polyps and cancers. If found, polyps can be removed right away. This test gives the most accurate results. If any other screening  tests are done first and come back positive (abnormal), a colonoscopy will need to be done for follow-up. If you have a colonoscopy as your first test, you will probably not need a second follow-up test soon after.     Drawbacks to this test - Colonoscopy has some risks. It can cause bleeding or tear the inside of the colon, but this only happens in 1 out of 1,000 people. Also, cleaning out the bowel beforehand can be unpleasant. Plus, people usually cannot work or drive for the rest of the day after the test, because of the relaxation medicine they take during the test.    ?Stool test for blood - Stool tests most commonly check for blood in samples of stool. Cancers and polyps can bleed, and if they bleed around the time you do the stool test, then blood will show up on the test. The test can find even small amounts of blood that you can't see in your stool. Other less serious conditions can also cause small amounts of blood in the stool, and that will show up in this test. You will have to collect small samples from your bowel movements, which you will put in a special container you get from your doctor or nurse. Then you follow the instructions to mail the container out for the testing.     Advantages of this test - This test does not involve cleaning out the colon or having any procedures.     Drawbacks to this test - Stool tests are less likely to find polyps than other screening tests. These tests also often come up abnormal even in people who do not have cancer. If a stool test shows something abnormal, doctors usually follow up with a colonoscopy.    ?Stool DNA test (COLOGUARD)- The stool DNA test checks for genetic markers of cancer, as well as for signs of blood. For this test, you get a special kit in order to collect a whole bowel movement. Then you follow the instructions about how and where to ship it.     Advantages of this test - This test does not involve cleaning out the colon or having any  procedures. When cancer is not present, it is less likely to be falsely abnormal than a stool test for blood. That means it leads to fewer unnecessary colonoscopies.     Drawbacks to this test - It might be unpleasant to collect and ship a whole bowel movement. If a DNA test shows something abnormal, doctors usually follow up with a colonoscopy.    ______________________________________________________________________     How do I choose which test to have? -- Work with your doctor or nurse to decide which test is best for you. Being screened-no matter how-is more important than which test you choose.    Who should be screened for colon cancer? -- Doctors recommend that most people begin having colon cancer screening at age 50.  Most people can stop being screened around the age of 75.    How often should I be screened? -- That depends on your risk of colon cancer and which test you have. People who have a high risk of colon cancer often need to be tested more often and should have a colonoscopy.    Most people are not at high risk, so they can choose one of these schedules:  ?Colonoscopy every 10 years  ?Stool testing for blood once a year  ?Stool DNA testing every 3 years (but doctors are not yet sure of the best time frame for repeating the test)      (adapted from [facility] patient information)

## 2021-06-17 NOTE — TELEPHONE ENCOUNTER
Telephone Encounter by Bo William CMA at 1/18/2021 10:51 AM     Author: Bo William CMA Service: -- Author Type: Certified Medical Assistant    Filed: 1/18/2021 10:52 AM Encounter Date: 1/18/2021 Status: Signed    : Bo William CMA (Certified Medical Assistant)       Pharmacy requesting refill of torsemide (DEMADEX)  Pt last seen 12/22/20  Next appointment 2/15/21    Plan:      1. Okay to proceed with surgery as planned.  2. Doing well overall.  3. Follow-up again in February for routine physical.  4. Reviewed colon cancer screening again at next visit.  5. Patient declines flu shot.  6. Tetanus update due in November of next year.  7. Information related to shingles shot given today in the AVS.              Noe Beckwith MD  General Internal Medicine  [facility]     Return in about 2 months (around 2/22/2021) for physical.

## 2021-06-19 NOTE — LETTER
Letter by Noe Beckwith MD at      Author: Noe Beckwith MD Service: -- Author Type: --    Filed:  Encounter Date: 4/23/2019 Status: (Other)         4/23/2019    Alvin Fitzpatrick   [address] [place] [id number]         Dear Alvin,    It was good to see you in clinic.  I hope your questions were answered at the time of your visit.    The results of your tests from your visit were as follows:    Resulted Orders   HM2(CBC w/o Differential)   Result Value Ref Range    WBC 3.7 (L) 4.0 - 11.0 thou/uL    RBC 4.31 (L) 4.40 - 6.20 mill/uL    Hemoglobin 14.8 14.0 - 18.0 g/dL    Hematocrit 44.1 40.0 - 54.0 %     (H) 80 - 100 fL    MCH 34.3 (H) 27.0 - 34.0 pg    MCHC 33.5 32.0 - 36.0 g/dL    RDW 11.3 11.0 - 14.5 %    Platelets 173 140 - 440 thou/uL    MPV 8.2 7.0 - 10.0 fL   Basic Metabolic Panel   Result Value Ref Range    Sodium 141 136 - 145 mmol/L    Potassium 4.3 3.5 - 5.0 mmol/L    Chloride 100 98 - 107 mmol/L    CO2 30 22 - 31 mmol/L    Anion Gap, Calculation 11 5 - 18 mmol/L    Glucose 90 70 - 125 mg/dL    Calcium 10.1 8.5 - 10.5 mg/dL    BUN 21 8 - 22 mg/dL    Creatinine 0.80 0.70 - 1.30 mg/dL    GFR MDRD Af Amer >60 >60 mL/min/1.73m2    GFR MDRD Non Af Amer >60 >60 mL/min/1.73m2    Narrative    Fasting Glucose reference range is 70-99 mg/dL per  American Diabetes Association (ADA) guidelines.     Your kidney tests are normal.  Your electrolytes are also normal.  There is no signs of diabetes.     Your blood counts are stable and good for surgery.    Best wishes on your upcoming surgery.     Please follow up again in January for your physical.    If you have any questions regarding these results, please feel free to contact me at [phone].  I wish you the best of health!        Sincerely,      Noe Beckwith MD  General Internal Medicine  [facility]

## 2021-06-23 NOTE — PATIENT INSTRUCTIONS - HE
The new shingles shot (Shingrix) is 2 separate shots  by 2-6 months.    The cost out of pocket is around $390 for the 2 shots, so you might want to see if your insurance covers it or a portion of it prior to having it done.     It is estimated that any person's risk of shingles over their lifetime is around 10-20%.    If you have had the old shingles vaccine (Zostavax), it is about 50% effective, reducing your risk of shingles to about 5-10% over your lifetime.    The new shot is 90% effective, reducing your risk of shingles to about 1-2% over your lifetime.    Because the shot is strong, it is very common to have flu like symptoms for 2-3 days after the shot.  25-50% of patients will have fever, muscle aches or headache.    I believe that whether or not you have this vaccine is your own decision depending on your values and preferences.  The information above I give you to make an informed decision.    Noe Beckwith MD  [facility]    ______________________________________________________________________     To schedule an appointment with [facility], please call [phone].     Dr. Ho Ya

## 2021-06-25 NOTE — PROGRESS NOTES
Progress Notes by Noe Beckwith MD at 11/6/2017  8:50 AM     Author: Noe Beckwith MD Service: -- Author Type: Physician    Filed: 11/6/2017 12:57 PM Encounter Date: 11/6/2017 Status: Signed    : Noe Beckwith MD (Physician)              [facility]    Comprehensive and complex medical care - Chronic disease management - Shared decision making - Care coordination - Compassionate care    Patient advocacy - Rational deprescribing - Minimally disruptive medicine - Ethical focus - Customized care    ______________________________________________________________________     Date of Service: 11/6/2017  Primary Provider: Noe Beckwith MD    Patient Care Team:  Noe Beckwith MD as PCP - General (Internal Medicine)  Kiran Laird MD as Physician (General Surgery)     ______________________________________________________________________     Patient's Pharmacy:    [facility] [address]  Phone: [phone] Fax: [phone]     Patient's Contacts:  Name Home Phone Work Phone Mobile Phone Relationship Lgl LEIGH Brown [phone] Spouse      Patient's Insurance:    Payor: BLUE CROSS / Plan: BLUE CROSS OUT OF STATE / Product Type: PPO/POS/FFS /     ______________________________________________________________________     Routine physical - male, age 50-59    Alvin Fitzpatrick is a 52 y.o. male coming in today for a routine physical.  He does have other issues outside the physical to discuss as well.    Past Medical History:   Diagnosis Date   ? 10 year risk of MI or stroke 1.8% in 2016    ? Exposure to hepatitis C     wife had treatment for hep c   ? Former smoker    ? Gout    ? HTN (hypertension)    ? Laparoscopic gastric sleeve operation -  June 2015. 7/6/2015   ? Lower extremity venous stasis    ? Morbid obesity    ? ANASTACIO on CPAP    ?  Osteoarthritis of right hip 8/11/2015   ? Venous insufficiency      Social History     Social History   ? Marital status:      Spouse name: N/A   ? Number of children: 2   ? Years of education: N/A     Occupational History   ?  [employer]     Social History Main Topics   ? Smoking status: Former Smoker   ? Smokeless tobacco: None   ? Alcohol use No   ? Drug use: None   ? Sexual activity: Not Asked     Other Topics Concern   ? None     Social History Narrative    Patient of Dr. Beckwith since 2013.         .      Family History   Problem Relation Age of Onset   ? Osteoporosis Mother    ? Alcoholism Mother    ? Gout Father      Family history is otherwise noncontributory.    Current Outpatient Prescriptions   Medication Sig Note   ? calcium carb & citrate-vit D3 (CITRACAL + D SLOW RELEASE) 600 mg calcium- 500 unit TbER Take 800 Units by mouth 3 (three) times a day.     ? celecoxib (CELEBREX) 200 MG capsule TAKE ONE CAPSULE BY MOUTH TWICE A DAY    ? cyanocobalamin 1000 MCG tablet Take 1,000 mcg by mouth as needed.     ? indomethacin (INDOCIN) 50 MG capsule Take 50 mg by mouth 2 (two) times a day as needed. Gout 12/30/2014: Received from: [facility]   ? MULTIVITAMINS WITH IRON (CHEWABLE MULTI VITAMIN W/IRON ORAL) Take 2 tablets by mouth Daily at 8:00 am..     ? polyethylene glycol (MIRALAX) 17 gram packet Take 17 g by mouth daily as needed.  10/2/2015: Received from: [facility]   ? torsemide (DEMADEX) 20 MG tablet TAKE ONE TABLET BY MOUTH EVERY DAY    ? urea (CARMOL) 20 % cream Apply topically daily. (Patient taking differently: Apply topically as needed. )    ? allopurinol (ZYLOPRIM) 300 MG tablet TAKE ONE TABLET BY MOUTH EVERY DAY        Immunization History   Administered Date(s) Administered   ? Influenza, seasonal,quad inj 36+ mos 09/29/2016, 11/06/2017   ? Influenza, seasonal,quad inj 6-35 mos 10/06/2014   ? Td, Adult, Absorbed 09/06/2001   ? Td, historic 10/26/2007   ? Tdap  "11/11/2011      ______________________________________________________________________     History of present illness:    He comes in first to review his blood pressure.  His blood pressure has been doing well.  He has no concerns about it.    We reviewed his gout and he has had no recent gout episodes for the last year.    We reviewed his venous insufficiency and he still has issues with this after his gastric sleeve operation, but is not really that severe.  He does get some swelling on flights.    We reviewed his right shoulder pain.  He is wondering about arthritis.  He notes it mainly with abduction.  It does occasionally wake him up at night.  He has not noticed if he has lost strength.  He has some limitation in motion to abduction and with reaching behind his back.  He denies any pain in the left shoulder.  He denies any injury.    He has right hip osteoarthritis and he is noticed this a bit more.  He does not limit him too bad at this point.  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    We reviewed colon cancer screening and he wishes to do stool based testing at this time rather than colonoscopy but he may consider this in the future.    He uses his sleep apnea machine well and has no issues with this.    10 point review of systems are per the health history form.    ______________________________________________________________________     Exam:    Wt Readings from Last 3 Encounters:   11/06/17 (!) 227 lb 14.4 oz (103.4 kg)   09/29/16 211 lb (95.7 kg)   12/14/15 (!) 256 lb (116.1 kg)     BP Readings from Last 3 Encounters:   11/06/17 122/78   09/29/16 106/78   12/14/15 126/72     /78  Pulse 69  Ht 5' 5.25\" (1.657 m)  Wt (!) 227 lb 14.4 oz (103.4 kg)  SpO2 98%  BMI 37.63 kg/m2   The patient is in no acute distress.  Mood good.  Insight good.  No skin lesions or nodules of concern.  Ears are clear.  Nose is clear.  Throat is clear.  Neck is supple  and there is no thyromegaly.  No carotid " bruits.  No cervical, epitrochlear or axillary adenopathy.  Heart regular rate and rhythm.  No murmur present.  Lungs clear to auscultation bilaterally.  Respiratory effort is good.  Abdomen is soft, nontender.  No hepatosplenomegaly.  No hernia appreciated.  No aortic aneurysm appreciated.  Extremities show trace edema.  He has chronic venous stasis changes.  Neuro exam shows normal strength in all muscle groups and normal reflexes.  Testicular exam shows no abnormality.  He has positive impingement signs on the right, but not severe.  There is no muscle weakness appreciated in the right shoulder.  He walks with a decent gait overall without a whole lot of issues.    ______________________________________________________________________    Diagnostics:    Results for orders placed or performed in visit on 11/06/17   HM2(CBC w/o Differential)   Result Value Ref Range    WBC 3.8 (L) 4.0 - 11.0 thou/uL    RBC 4.39 (L) 4.40 - 6.20 mill/uL    Hemoglobin 14.6 14.0 - 18.0 g/dL    Hematocrit 43.8 40.0 - 54.0 %     80 - 100 fL    MCH 33.1 27.0 - 34.0 pg    MCHC 33.2 32.0 - 36.0 g/dL    RDW 11.5 11.0 - 14.5 %    Platelets 159 140 - 440 thou/uL    MPV 7.9 7.0 - 10.0 fL      ______________________________________________________________________    Pertinent radiology for this visit includes the following:    Xr Shoulder Right 2 Or More Vws    Result Date: 11/6/2017  EXAM DATE:         11/06/2017 [facility] X-RAY SHOULDER RIGHT, MINIMUM 2 VIEWS 11/6/2017 10:45 AM INDICATION: RIGHT HIP PAIN COMPARISON: None. FINDINGS: Narrowing of the subacromial worrisome for rotator cuff tearing/rotator cuff arthropathy. Osteophytic spurring along the acromial clavicular joint. No evidence for fracture. No evidence for dislocation.     Xr Pelvis W 2 Vw Hip Right    Result Date: 11/6/2017  EXAM DATE:         11/06/2017 2 VIEWS RIGHT HIP 11/6/2017 INDICATION: Hip pain REPORT: There is severe end stage degenerative  arthropathy involving the right hip joint with complete obliteration of the superior aspect of the joint space. However, there is also a segment of trabecular irregularity traversing the right femoral neck and a nondisplaced incomplete irregular fracture could have this appearance. MRI would be helpful for further evaluation if indicated. There is much less advanced degenerative change left hip. Exam otherwise negative.       ______________________________________________________________________    ______________________________________________________________________     Assessment:    1. Routine physical examination    2. Need for influenza vaccination    3. Screen for colon cancer    4. Gout    5. HTN (hypertension)    6. Laparoscopic gastric sleeve operation -  June 2015.    7. Osteoarthritis of right hip    8. ANASTACIO on CPAP    9. Venous insufficiency    10. Right hip pain    11. Right shoulder pain    12. Shoulder impingement syndrome, right       ______________________________________________________________________     PHQ-2 Total Score: 0 (11/6/2017  8:00 AM)  No Data Recorded    Plan:    1. Given a handout for his right shoulder for home physical therapy.  His other options for this include formal physical therapy, corticosteroid injection, MRI, or orthopedic referral.  2. His x-ray of his hip looks fairly poor.  This is regressed over time.  He is not having too much pain with this at this time.  However, if he worsens, I would recommend that he see Dr. Ya at [facility] orthopedics.  3. Extensive blood work done today.  4. He continues to use his CPAP regularly and derives benefit from it.  5. Continue torsemide at this time for edema but could try tapering if able.  6. No major symptoms or signs of gout at this time.  7. He opts for a stool based test for colon cancer screening.    Noe Beckwith MD  General Internal Medicine  [facility]    Return in about 1 year (around  11/6/2018), or if symptoms worsen or fail to improve.

## 2021-06-27 NOTE — PROGRESS NOTES
Progress Notes by Noe Beckwith MD at 4/22/2019 10:05 AM     Author: Noe Beckwith MD Service: -- Author Type: Physician    Filed: 4/22/2019  3:28 PM Encounter Date: 4/22/2019 Status: Signed    : Noe Beckwith MD (Physician)            [facility]    Comprehensive and complex medical care - Chronic disease management - Shared decision making - Care coordination - Compassionate care    Patient advocacy - Rational deprescribing - Minimally disruptive medicine - Ethical focus - Customized care    ______________________________________________________________________     Date of Service: 4/22/2019  Primary Provider: Noe Beckwith MD    Patient Care Team:  Noe Beckwith MD as PCP - General (Internal Medicine)  Kiran Laird MD as Physician (General Surgery)  Ho Ya MD as Physician (Orthopedic Surgery)     ______________________________________________________________________     Patient's Pharmacy:    [facility] [address]  Phone: [phone] Fax: [phone]     Patient's Contacts:  Name Home Phone Work Phone Mobile Phone Relationship Lgl Cr   LEIGH CAMERON [phone] Spouse    NONE,PER PT    Declined      Patient's Insurance:    Payor: BLUE CROSS / Plan: BLUE CROSS OUT OF STATE / Product Type: PPO/POS/FFS /     ______________________________________________________________________     Preoperative examination    Alvin Cameron is a 53 y.o. male (1965) who I am asked to see by his surgeon regarding his fitness for upcoming surgery.      Chief Complaint   Patient presents with   ? Pre-op Exam     RIGHT TOTAL HIP ARTHROPLASTY, DIRECT ANTERIOR APPROACH, 5/1/19, DR STEF SORIA, [place]   ? Nevus     ON NECK, NO CHANGES WOULD LIKE IT LOOKED AT     ______________________________________________________________________      History of present illness:    Patient comes in today for preoperative cardiovascular evaluation prior to his planned right total hip arthroplasty at [facility].    He has been having progressive problems with the hip over the last 3 years.  He takes Celebrex for this.  It is limiting his functioning even more as time has gone on.  He does want to move forward with surgery at this time.  This was reviewed with him.    We reviewed his sleep apnea and he does use his CPAP regularly.  He will be bringing this into the hospital.    We reviewed his hypertension and he uses torsemide for this.  If this does work well for him.  He denies any excessive swelling in his legs.  His breathing has been good.  He denies any chest pain.  Stress test done in 2015 was as noted below and was reviewed with him.    His gout is doing well without any obvious findings.    We reviewed his other issues noted in the assessment but not specifically addressed in the HPI above.   ______________________________________________________________________     Patient Active Problem List   Diagnosis   ? HTN (hypertension)   ? Gout   ? Venous insufficiency   ? Laparoscopic gastric sleeve operation -  June 2015.   ? Osteoarthritis of right hip   ? ANASTACIO on CPAP   ? Former smoker   ? Obesity (BMI 35.0-39.9) with comorbidity (H)   ? NSAID long-term use     Past Surgical History:   Procedure Laterality Date   ? Laparoscopic gastric sleeve surgery   6/12/15      Social History     Socioeconomic History   ? Marital status:      Spouse name: None   ? Number of children: 3   ? Years of education: None   ? Highest education level: None   Occupational History   ? Occupation:      Employer: [email or web address]   Social Needs   ? Financial resource strain: None   ? Food insecurity:     Worry: None     Inability: None   ? Transportation needs:     Medical: None     Non-medical: None   Tobacco Use   ? Smoking status: Former Smoker   ?  Smokeless tobacco: Never Used   Substance and Sexual Activity   ? Alcohol use: No   ? Drug use: None   ? Sexual activity: None   Lifestyle   ? Physical activity:     Days per week: None     Minutes per session: None   ? Stress: None   Relationships   ? Social connections:     Talks on phone: None     Gets together: None     Attends [employer] service: None     Active member of club or organization: None     Attends meetings of clubs or organizations: None     Relationship status: None   ? Intimate partner violence:     Fear of current or ex partner: None     Emotionally abused: None     Physically abused: None     Forced sexual activity: None   Other Topics Concern   ? None   Social History Narrative    Patient of Dr. Beckwith since 2013.         .      Family History   Problem Relation Age of Onset   ? Osteoporosis Mother    ? Alcoholism Mother    ? Gout Father    ? No Medical Problems Sister    ? No Medical Problems Sister    ? No Medical Problems Sister    ? No Medical Problems Daughter    ? No Medical Problems Daughter    ? No Medical Problems Son       Family history is noncontributory otherwise.    Allergies: Lisinopril and Losartan     Current medications:  Current Outpatient Medications   Medication Sig   ? allopurinol (ZYLOPRIM) 300 MG tablet TAKE ONE TABLET BY MOUTH EVERY DAY   ? calcium carb & citrate-vit D3 (CITRACAL + D SLOW RELEASE) 600 mg calcium- 500 unit TbER Take 800 Units by mouth 3 (three) times a day.    ? celecoxib (CELEBREX) 200 MG capsule TAKE ONE CAPSULE BY MOUTH TWICE A DAY AS DIRECTED   ? cyanocobalamin 1000 MCG tablet Take 1,000 mcg by mouth as needed.    ? MULTIVITAMINS WITH IRON (CHEWABLE MULTI VITAMIN W/IRON ORAL) Take 1 tablet by mouth 2 (two) times a day.          ? torsemide (DEMADEX) 20 MG tablet TAKE ONE TABLET BY MOUTH EVERY DAY   ? oxyCODONE-acetaminophen (PERCOCET/ENDOCET) 5-325 mg per tablet Take 1-2 tablets by mouth 2 (two) times a day as needed for pain.       Surgery  "specific questions:    Anesthesia/family history of complications: No  History of abnormal bleeding: No  Can patient walk a flight of stairs without stopping: Yes    Review of systems:    On ROS, the patient denies any chest pain or pressure.  No shortness of breath.   ______________________________________________________________________     Exam:    Wt Readings from Last 3 Encounters:   19 (!) 240 lb (108.9 kg)   19 (!) 241 lb 3.2 oz (109.4 kg)   17 (!) 227 lb 14.4 oz (103.4 kg)     BP Readings from Last 3 Encounters:   19 122/80   19 112/68   17 122/78     /80   Pulse 78   Temp 98.5  F (36.9  C) (Oral)   Ht 5' 4.5\" (1.638 m)   Wt (!) 240 lb (108.9 kg)   SpO2 97%   BMI 40.56 kg/m     Patient is in no acute distress.  Mood good.  Insight good.  Eyes nonicteric.  Pupils equal.  Ears clear.  Nose clear.  Throat clear.  Neck is supple.  No cervical adenopathy.  No thyromegaly.  Heart is regular rate and rhythm.  There is a musical murmur of the base of the heart which is 1 out of 6  in intensity.  Lungs clear to auscultation bilaterally.  Respiratory effort is good.  Abdomen is soft, nontender, no hepatosplenomegaly.  Extremities no edema.     ______________________________________________________________________    Diagnostics:    Results for orders placed or performed in visit on 19   HM2(CBC w/o Differential)   Result Value Ref Range    WBC 3.7 (L) 4.0 - 11.0 thou/uL    RBC 4.31 (L) 4.40 - 6.20 mill/uL    Hemoglobin 14.8 14.0 - 18.0 g/dL    Hematocrit 44.1 40.0 - 54.0 %     (H) 80 - 100 fL    MCH 34.3 (H) 27.0 - 34.0 pg    MCHC 33.5 32.0 - 36.0 g/dL    RDW 11.3 11.0 - 14.5 %    Platelets 173 140 - 440 thou/uL    MPV 8.2 7.0 - 10.0 fL       ______________________________________________________________________    Echo  stress test with definity2015  [place]  Result Narrative   Interpretation Summary                [facility]" [facility]  Echocardiography Laboratory  [address] [place] [id number]    Name: AYANA CAMERON  MRN: [id number]  : 1965  Study Date: 2015 01:32 PM  Age: 49 yrs  Gender: Male  Patient Location: [facility]  Reason For Study:    History: Pre-op evaluation  Ordering Physician: ADAL LASSITRE  Referring Physician: ADAL LASSITER BANKMARIAN  Performed By: Yany Liang RDCS    BSA: 2.5 m2  Height: 65 in  Weight: 350 lb  BP: 140/65 mmHg  ______________________________________________________________________________    ______________________________________________________________________________    Interpretation Summary  Normal dobutamine stress echo at target heartrate. No significant valvular  abnormality.  ______________________________________________________________________________    Stress  The maximum dose of dobutamine was 40mcg/kg/min.  The maximum dose of metoprolol was 3.5mg.  Patient was given 9.0ml mixture of 1.5ml Definity and 8.5ml saline.  Definity Expiration 2016 .  Definity Lot # [id number] .  Target Heart Rate was achieved.  Normal blood pressure response to dobutamine.  The EKG portion of this stress test was negative for inducible ischemia (see  echo results below).  This was a normal stress echocardiogram.  The visual ejection fraction is estimated at >70%.    Baseline  Normal baseline electrocardiogram.  The resting phase of the study was normal.  The visual ejection fraction is estimated at 55-60%.  No regional wall motion abnormalities noted.  Stress Results       ______________________________________________________________________       ______________________________________________________________________     Impression:    1. Preoperative cardiovascular examination    2. Primary osteoarthritis of right hip    3. Essential hypertension    4. Chronic gout of multiple sites, unspecified cause    5. Laparoscopic gastric sleeve operation -  2015.    6. ANASTACIO on CPAP    7.  Obesity (BMI 35.0-39.9) with comorbidity (H)    8. NSAID long-term use    9. Venous insufficiency    10. Colonoscopy refused - 2019      ______________________________________________________________________     PHQ-2 Total Score: 0 (4/22/2019 10:00 AM)    No data recorded  ______________________________________________________________________     Recommendations:    1. Patient is okay to proceed with surgery as planned.  2. We were unable to do an EKG in the clinic today as our EKG machine was down.  Stress test in 2015 was extremely normal.  EKG may be done at the hospital if needed.  Patient is at low risk for perioperative cardiac complications.  3. He may take his torsemide and his allopurinol on the morning of surgery with a sip of water.  blood work was checked today related to this.  4. He is still due for colonoscopy or Cologuard testing and he has not followed through on this.  He is not wanting to consider this at this time but might consider in the future.  5. He is to bring his CPAP equipment to the hospital for postoperative care of his sleep apnea.  6. Information given related to Shingrix vaccination today.         Noe Beckwith MD  General Internal Medicine  [facility]     Personal office fax - [phone]   Voice mail - [phone]  E-mail - [email or web address]      Return in about 9 months (around 1/22/2020) for physical.     No future appointments.     ______________________________________________________________________     Relevant ICD-10 codes and order associations:      ICD-10-CM    1. Preoperative cardiovascular examination Z01.810    2. Primary osteoarthritis of right hip M16.11 HM2(CBC w/o Differential)     oxyCODONE-acetaminophen (PERCOCET/ENDOCET) 5-325 mg per tablet   3. Essential hypertension I10 Electrocardiogram Perform and Read     Basic Metabolic Panel   4. Chronic gout of multiple sites, unspecified cause M1A.09X0    5. Laparoscopic gastric sleeve  operation -  June 2015. Z98.84    6. ANASTACIO on CPAP G47.33     Z99.89    7. Obesity (BMI 35.0-39.9) with comorbidity (H) E66.01    8. NSAID long-term use Z79.1    9. Venous insufficiency I87.2    10. Colonoscopy refused - 2019 Z53.20

## 2021-06-27 NOTE — PROGRESS NOTES
Progress Notes by Noe Beckwith MD at 1/11/2019  8:25 AM     Author: Noe Beckwith MD Service: -- Author Type: Physician    Filed: 1/12/2019 10:15 PM Encounter Date: 1/11/2019 Status: Signed    : Noe Beckwith MD (Physician)              [facility]    Comprehensive and complex medical care - Chronic disease management - Shared decision making - Care coordination - Compassionate care    Patient advocacy - Rational deprescribing - Minimally disruptive medicine - Ethical focus - Customized care    ______________________________________________________________________     Date of Service: 1/11/2019  Primary Provider: Noe Beckwith MD    Patient Care Team:  Noe Beckwith MD as PCP - General (Internal Medicine)  Kiran Laird MD as Physician (General Surgery)     ______________________________________________________________________     Patient's Pharmacy:    [facility] [address]  Phone: [phone] Fax: [phone]     Patient's Contacts:  Name Home Phone Work Phone Mobile Phone Relationship Lgl Jaironkillian   LEIGH CAMERON [phone] Spouse      Patient's Insurance:    Payor: BLUE CROSS / Plan: BLUE CROSS OUT OF STATE / Product Type: PPO/POS/FFS /     ______________________________________________________________________     Routine physical - male, age 50-59    Alvin Cameron is a 53 y.o. male coming in today for a routine physical.  He does have other issues outside the physical to discuss as well.    Active Problem List:  Problem List as of 1/11/2019 Reviewed: 1/10/2019  8:08 PM by Noe Beckwith MD       High    Former smoker       Medium    HTN (hypertension)    Gout    Laparoscopic gastric sleeve operation -  June 2015.    Osteoarthritis of right hip       Low    ANASTACIO on CPAP    Venous insufficiency       Unprioritized    Obesity (BMI  35.0-39.9) with comorbidity (H)           Past Medical History:   Diagnosis Date   ? Exposure to hepatitis C     wife had treatment for hep c   ? Former smoker    ? Gout    ? HTN (hypertension)    ? Laparoscopic gastric sleeve operation -  June 2015. 7/6/2015   ? Lower extremity venous stasis    ? Morbid obesity (H)    ? ANASTACIO on CPAP    ? Osteoarthritis of right hip 8/11/2015   ? Venous insufficiency      Past Surgical History:   Procedure Laterality Date   ? Laparoscopic gastric sleeve surgery   6/12/15     Social History     Socioeconomic History   ? Marital status:      Spouse name: None   ? Number of children: 3   ? Years of education: None   ? Highest education level: None   Social Needs   ? Financial resource strain: None   ? Food insecurity - worry: None   ? Food insecurity - inability: None   ? Transportation needs - medical: None   ? Transportation needs - non-medical: None   Occupational History   ? Occupation:      Employer: [employer]   Tobacco Use   ? Smoking status: Former Smoker   ? Smokeless tobacco: Never Used   Substance and Sexual Activity   ? Alcohol use: No   ? Drug use: None   ? Sexual activity: None   Other Topics Concern   ? None   Social History Narrative    Patient of Dr. Beckwith since 2013.         .      Family History   Problem Relation Age of Onset   ? Osteoporosis Mother    ? Alcoholism Mother    ? Gout Father    ? No Medical Problems Sister    ? No Medical Problems Sister    ? No Medical Problems Sister    ? No Medical Problems Daughter    ? No Medical Problems Daughter    ? No Medical Problems Son      Family history is otherwise noncontributory.    Current Outpatient Medications   Medication Sig   ? allopurinol (ZYLOPRIM) 300 MG tablet TAKE ONE TABLET BY MOUTH EVERY DAY   ? calcium carb & citrate-vit D3 (CITRACAL + D SLOW RELEASE) 600 mg calcium- 500 unit TbER Take 800 Units by mouth 3 (three) times a day.    ? celecoxib (CELEBREX) 200 MG capsule  TAKE ONE CAPSULE BY MOUTH TWICE A DAY AS DIRECTED   ? cyanocobalamin 1000 MCG tablet Take 1,000 mcg by mouth as needed.    ? MULTIVITAMINS WITH IRON (CHEWABLE MULTI VITAMIN W/IRON ORAL) Take 2 tablets by mouth Daily at 8:00 am..    ? torsemide (DEMADEX) 20 MG tablet TAKE ONE TABLET BY MOUTH EVERY DAY     Allergies: Lisinopril and Losartan     Immunization History   Administered Date(s) Administered   ? Influenza, seasonal,quad inj 36+ mos 09/29/2016, 11/06/2017   ? Influenza, seasonal,quad inj 6-35 mos 10/06/2014   ? Td, Adult, Absorbed 09/06/2001   ? Td, adult adsorbed, PF 10/26/2007   ? Tdap 11/11/2011      ______________________________________________________________________     History of present illness:    Patient comes in today for follow-up of a number of issues.    We first reviewed his obesity and his weight gain.  Please note this as below.  He has had gastric sleeve operation.  We will be following up on this.  He feels that he has gaining weight because he is not able to be as active with his right hip.  This is worsening for him.    We reviewed his right hip pain.  He has known osteoarthritis in his hip.  It is felt to be severe.  The patient has tried to spend prolonged times up on his feet but it seems to make the hip worse and worse.  He is at the point of needing to do something more with it at this point.  To this point, we have just given him Celebrex for the pain which had helped somewhat.  He would like to go forward with orthopedic referral if appropriate.    We reviewed a heart murmur that he has had and we are checking up on this.    He has sleep apnea that is well controlled and he recently saw sleep medicine.    We reviewed his gout and he has had no problems with gout in the recent past.  His blood pressure is doing well.    His venous insufficiency is doing okay.  The low-dose of torsemide seems to help this.  He does not have significant scaling on his legs like he had previous to his  "bypass surgery.    His shoulder pain has resolved.    We reviewed colon cancer screening and he agrees to cologuard.  He will check with his insurance related to this to make sure it is covered.  He is not interested in colonoscopy at this point.    10 point review of systems are per the health history form.    ______________________________________________________________________     Exam:    Wt Readings from Last 3 Encounters:   01/11/19 (!) 241 lb 3.2 oz (109.4 kg)   11/06/17 (!) 227 lb 14.4 oz (103.4 kg)   09/29/16 211 lb (95.7 kg)     BP Readings from Last 3 Encounters:   01/11/19 112/68   11/06/17 122/78   09/29/16 106/78     /68   Pulse 74   Ht 5' 4.75\" (1.645 m)   Wt (!) 241 lb 3.2 oz (109.4 kg)   SpO2 98%   BMI 40.45 kg/m     The patient is in no acute distress.  Mood good.  Insight good.  No skin lesions or nodules of concern.  Ears are clear.  Nose is clear.  Throat is clear.  Neck is supple  and there is no thyromegaly.  No carotid bruits.  No cervical, epitrochlear or axillary adenopathy.  Heart regular rate and rhythm.  There is a 2/6 high pitched, musical basilar murmur present.  Lungs clear to auscultation bilaterally.  Respiratory effort is good.  Abdomen is soft, nontender.  No hepatosplenomegaly.  No hernia appreciated.  Extremities show trace edema.  He has chronic venous stasis changes.  Neuro exam shows normal strength in all muscle groups and normal reflexes.  He walks with a limp related to his right hip but not severe.    ______________________________________________________________________    Diagnostics:    Results for orders placed or performed in visit on 01/11/19   Ferritin   Result Value Ref Range    Ferritin 481 (H) 27 - 300 ng/mL   Iron and Transferrin Iron Binding Capacity   Result Value Ref Range    Iron 185 (H) 42 - 175 ug/dL    Transferrin 250 212 - 360 mg/dL    Transferrin Saturation, Calculated 59 (H) 20 - 50 %    Transferrin IBC, Calculated 313 313 - 563 ug/dL "   HM2(CBC w/o Differential)   Result Value Ref Range    WBC 4.9 4.0 - 11.0 thou/uL    RBC 4.34 (L) 4.40 - 6.20 mill/uL    Hemoglobin 14.5 14.0 - 18.0 g/dL    Hematocrit 43.8 40.0 - 54.0 %     (H) 80 - 100 fL    MCH 33.5 27.0 - 34.0 pg    MCHC 33.2 32.0 - 36.0 g/dL    RDW 11.0 11.0 - 14.5 %    Platelets 161 140 - 440 thou/uL    MPV 7.8 7.0 - 10.0 fL   Comprehensive Metabolic Panel   Result Value Ref Range    Sodium 141 136 - 145 mmol/L    Potassium 4.2 3.5 - 5.0 mmol/L    Chloride 98 98 - 107 mmol/L    CO2 30 22 - 31 mmol/L    Anion Gap, Calculation 13 5 - 18 mmol/L    Glucose 87 70 - 125 mg/dL    BUN 15 8 - 22 mg/dL    Creatinine 0.88 0.70 - 1.30 mg/dL    GFR MDRD Af Amer >60 >60 mL/min/1.73m2    GFR MDRD Non Af Amer >60 >60 mL/min/1.73m2    Bilirubin, Total 1.0 0.0 - 1.0 mg/dL    Calcium 9.6 8.5 - 10.5 mg/dL    Protein, Total 7.6 6.0 - 8.0 g/dL    Albumin 4.0 3.5 - 5.0 g/dL    Alkaline Phosphatase 79 45 - 120 U/L    AST 22 0 - 40 U/L    ALT 13 0 - 45 U/L   Vitamin B12   Result Value Ref Range    Vitamin B-12 >2,000 (H) 213 - 816 pg/mL   Uric Acid   Result Value Ref Range    Uric Acid 6.4 3.0 - 8.0 mg/dL   BNP(B-type Natriuretic Peptide)   Result Value Ref Range    BNP 14 0 - 43 pg/mL     ______________________________________________________________________    Pertinent radiology for this visit includes the following:    XR Pelvis W 2 Vw Hip Right  EXAM DATE:         11/06/2017    2 VIEWS RIGHT HIP 11/6/2017    INDICATION: Hip pain    REPORT: There is severe end stage degenerative arthropathy involving the right hip joint with complete obliteration of the superior aspect of the joint space. However, there is also a segment of trabecular irregularity traversing the right femoral neck   and a nondisplaced incomplete irregular fracture could have this appearance. MRI would be helpful for further evaluation if indicated. There is much less advanced degenerative change left hip. Exam otherwise negative.    XR  Shoulder Right 2 or More VWS  EXAM DATE:         11/06/2017    [facility]  X-RAY SHOULDER RIGHT, MINIMUM 2 VIEWS  11/6/2017 10:45 AM    INDICATION: RIGHT HIP PAIN  COMPARISON: None.    FINDINGS: Narrowing of the subacromial worrisome for rotator cuff tearing/rotator cuff arthropathy. Osteophytic spurring along the acromial clavicular joint. No evidence for fracture. No evidence for dislocation.      ______________________________________________________________________    ______________________________________________________________________     Assessment:    1. Routine physical examination    2. Morbid obesity (H)    3. Essential hypertension    4. Chronic gout of multiple sites, unspecified cause    5. Laparoscopic gastric sleeve operation -  June 2015.    6. Primary osteoarthritis of right hip    7. ANASTACIO on CPAP    8. Venous insufficiency    9. Former smoker    [facility]. [facility]    11. Heart murmur    12. NSAID long-term use       ______________________________________________________________________     PHQ-2 Total Score: 0 (1/12/2019 10:00 PM)    No Data Recorded  ______________________________________________________________________     Plan:    1. Check blood work today.  See relevant orders and diagnosis associations at the bottom of this note.   2. Referred to Dr. Ho Ya at [facility].  To consider treatment options for the right hip.  This is worsening.  3. Consider echocardiogram if heart issues in the future.  4. Review SHINGRIX with patient again in the future. Given information today.    Noe Beckwith MD  General Internal Medicine  [facility]    Return in about 1 year (around 1/11/2020), or if symptoms worsen or fail to improve, for physical.    No future appointments.        ______________________________________________________________________     Relevant ICD-10 codes and order associations:      ICD-10-CM    1.  Routine physical examination Z00.00    2. Morbid obesity (H) E66.01    3. Essential hypertension I10 Comprehensive Metabolic Panel   4. Chronic gout of multiple sites, unspecified cause M1A.09X0 Uric Acid   5. Laparoscopic gastric sleeve operation -  June 2015. Z98.84 Ferritin     Iron and Transferrin Iron Binding Capacity     HM2(CBC w/o Differential)     Comprehensive Metabolic Panel     Vitamin B12   6. Primary osteoarthritis of right hip M16.11 Ambulatory referral to Orthopedics   7. ANASTACIO on CPAP G47.33     Z99.89    8. Venous insufficiency I87.2    9. Former smoker Z87.891    10. Screen for colon cancer - [facility] Z12.11    11. Heart murmur R01.1 BNP(B-type Natriuretic Peptide)   12. NSAID long-term use Z79.1

## 2021-06-28 NOTE — PROGRESS NOTES
Progress Notes by Noe Beckwith MD at 2/10/2020  8:00 AM     Author: Noe Beckwith MD Service: -- Author Type: Physician    Filed: 2/10/2020  5:01 PM Encounter Date: 2/10/2020 Status: Signed    : Noe Beckwith MD (Physician)                [facility]    Comprehensive and complex medical care - Chronic disease management - Shared decision making - Care coordination - Compassionate care    Patient advocacy - Rational deprescribing - Minimally disruptive medicine - Ethical focus - Customized care          Date of Service: 2/10/2020  Primary Provider: Noe Beckwith MD    Patient Care Team:  Noe Beckwith MD as PCP - General (Internal Medicine)  Kiran Laird MD as Physician (General Surgery)  oH Ya MD as Physician (Orthopedic Surgery)  Noe Beckwith MD as Assigned PCP     ______________________________________________________________________     Patient's Pharmacy:    [address] [address]  Phone: [phone] Fax: [phone]     Patient's Contacts:  Name Home Phone Work Phone Mobile Phone Relationship Lgl LEIGH Brown [phone] Spouse    NONE,PER PT    Declined      Patient's Insurance:    Payor: BLUE CROSS / Plan: BLUE CROSS OUT OF STATE / Product Type: PPO/POS/FFS /          Routine physical - male, age 50-65    Alvin Fitzpatrick is a 54 y.o. male coming in today for a routine physical.  He does have other issues outside the physical to discuss as well.    Chief Complaint   Patient presents with   ? Annual Exam     WEIGHT GAIN   ? Arthritis     SHOULDER   ? Dental Problem     CRACKED TOOTH ON LEFT SIDE IS GOING TO GO TO THE DENTIST        Active Problem List:  Problem List as of 2/10/2020 Reviewed: 2/10/2020  4:54 PM by Noe Beckwith MD       High    Former smoker - Quit in 1986       Medium    HTN  (hypertension)    Gout    Laparoscopic gastric sleeve operation -  June 2015.       Low    ANASTACIO on CPAP    Venous insufficiency       Unprioritized    Heart murmur    Morbid obesity (H)    NSAID long-term use           Past Medical History:   Diagnosis Date   ? Exposure to hepatitis C     wife had treatment for hep c   ? Former smoker    ? Gout    ? HTN (hypertension)    ? Laparoscopic gastric sleeve operation -  June 2015. 7/6/2015   ? Lower extremity venous stasis    ? Morbid obesity (H)    ? ANASTACIO on CPAP     CPAP   ? Osteoarthritis of right hip 8/11/2015   ? Pneumonia     as a child and as a teenager   ? Venous insufficiency      Past Surgical History:   Procedure Laterality Date   ? Laparoscopic gastric sleeve surgery   6/12/15   ? UT TOTAL HIP ARTHROPLASTY Right 5/1/2019    Procedure: RIGHT TOTAL HIP ARTHROPLASTY, DIRECT ANTERIOR APPROACH;  Surgeon: Ho Ya MD;  Location: [facility] OR;  Service: Orthopedics     Social History     Occupational History   ? Occupation:      Employer: [employer]   Tobacco Use   ? Smoking status: Former Smoker   ? Smokeless tobacco: Never Used   Substance and Sexual Activity   ? Alcohol use: No     Comment: rarely   ? Drug use: Yes     Types: Oxycodone   ? Sexual activity: Not on file      Social History     Social History Narrative    Patient of Dr. Beckwith since 2013.         .      Family History   Problem Relation Age of Onset   ? Osteoporosis Mother    ? Alcoholism Mother    ? Gout Father    ? No Medical Problems Sister    ? No Medical Problems Sister    ? No Medical Problems Sister    ? No Medical Problems Daughter    ? No Medical Problems Daughter    ? No Medical Problems Son      Family history is otherwise noncontributory.    Current Outpatient Medications   Medication Sig   ? acetaminophen (TYLENOL) 500 MG tablet Take 2 tablets (1,000 mg total) by mouth 3 (three) times a day.   ? allopurinol (ZYLOPRIM) 300 MG tablet TAKE ONE  TABLET BY MOUTH EVERY DAY   ? amoxicillin (AMOXIL) 500 MG capsule    ? celecoxib (CELEBREX) 200 MG capsule TAKE ONE CAPSULE BY MOUTH TWICE A DAY AS DIRECTED   ? cyanocobalamin 1000 MCG tablet Take 1 tablet (1,000 mcg total) by mouth daily.   ? MULTIVITAMINS WITH IRON (CHEWABLE MULTI VITAMIN W/IRON ORAL) Take 1 tablet by mouth 2 (two) times a day.          ? senna-docusate (PERICOLACE) 8.6-50 mg tablet Take 1 tablet by mouth 2 (two) times a day.   ? torsemide (DEMADEX) 20 MG tablet Take 1 tablet (20 mg total) by mouth daily.   ? HYDROcodone-acetaminophen 5-325 mg per tablet Take 1 tablet by mouth every 6 (six) hours as needed for pain.     Allergies: Lisinopril and Losartan  Immunization History   Administered Date(s) Administered   ? Influenza, seasonal,quad inj 6-35 mos 10/06/2014   ? Influenza,seasonal,quad inj =/> 6months 09/29/2016, 11/06/2017   ? Td, Adult, Absorbed 09/06/2001   ? Td, adult adsorbed, PF 10/26/2007   ? Tdap 11/11/2011             Patient comes up today for physical and for other issues.    He has generally been doing well.  He understands that he has to work on his weight.  We reviewed this with him closely today.  He had a gastric sleeve operation in the past.  He is still doing quite well in relationship to this but we encouraged him to continue to work on his weight.    We reviewed a molar which is cracked on his left side.  It has been severe in pain.  He had a fracture in this tooth previous but did not take care of it.  He has no redness or pus drainage.  He sees his dentist Thursday.    We reviewed his rotator cuff pain.  He has it more in his right shoulder than his left.  He has had a injection in his left shoulder which has helped.  The right shoulder is mostly over the deltoid and has been better in the last few weeks.    We reviewed his heart murmur.  This is a been a musical murmur.  He has no symptoms related to this.  He wishes to follow this at this point.    He does not wish to  "have hep C screening or HIV screening.    He does wish to proceed with colon cancer screening in the form of a Cologuard test at this point.  This will be ordered.  He knows that his insurance covers it.    He is not interested in flu shot nor Shingrix.    His blood pressure is doing well without issue.    He is really happy with his right hip replacement.  It is going well.  His left hip is not giving him troubles.  He is still taking Celebrex and I encouraged him to work off of this now that the hip is not so much of an issue.  He could go back to taking it as needed.    Reviewed his venous insufficiency and venous stasis changes.  This is doing well and he has had no sores related to this.    Reviewed SHINGRIX with patient today.     The 10-system review of systems and [facility] was completed by patient, reviewed by me, and pertinent problems are reviewed above.    Exam:     Wt Readings from Last 3 Encounters:   02/10/20 (!) 242 lb 12.8 oz (110.1 kg)   05/02/19 (!) 238 lb 4.8 oz (108.1 kg)   04/22/19 (!) 240 lb (108.9 kg)     BP Readings from Last 3 Encounters:   02/10/20 136/84   05/03/19 106/58   04/22/19 122/80     /84   Pulse 83   Ht 5' 5\" (1.651 m)   Wt (!) 242 lb 12.8 oz (110.1 kg)   SpO2 100%   BMI 40.40 kg/m     The patient is in no acute distress.  Mood good.  Insight good.  No skin lesions or nodules of concern.  Ears are clear.  Nose is clear.  Throat is clear.  Neck is supple  and there is no thyromegaly.  No carotid bruits.  No cervical, epitrochlear or axillary adenopathy.  Heart regular rate and rhythm.  There is a musical murmur along the left sternal border and in the right upper sternal border.  It is approximately a 2 out of 6 murmur.  Lungs clear to auscultation bilaterally.  Respiratory effort is good.  Abdomen is soft, nontender.  No hepatosplenomegaly.  No hernia appreciated.  Extremities show trace edema.  There is significant venous stasis changes.  " Neuro exam shows normal strength in all muscle groups and normal reflexes.      Diagnostics:     Results for orders placed or performed in visit on 02/10/20   Lipid [place] FASTING   Result Value Ref Range    Cholesterol 203 (H) <=199 mg/dL    Triglycerides 75 <=149 mg/dL    HDL Cholesterol 81 >=40 mg/dL    LDL Calculated 107 <=129 mg/dL    Patient Fasting > 8hrs? Yes    Basic Metabolic Panel   Result Value Ref Range    Sodium 137 136 - 145 mmol/L    Potassium 4.9 3.5 - 5.0 mmol/L    Chloride 98 98 - 107 mmol/L    CO2 28 22 - 31 mmol/L    Anion Gap, Calculation 11 5 - 18 mmol/L    Glucose 86 70 - 125 mg/dL    Calcium 10.2 8.5 - 10.5 mg/dL    BUN 20 8 - 22 mg/dL    Creatinine 0.80 0.70 - 1.30 mg/dL    GFR MDRD Af Amer >60 >60 mL/min/1.73m2    GFR MDRD Non Af Amer >60 >60 mL/min/1.73m2   HM2(CBC w/o Differential)   Result Value Ref Range    WBC 4.8 4.0 - 11.0 thou/uL    RBC 4.39 (L) 4.40 - 6.20 mill/uL    Hemoglobin 14.7 14.0 - 18.0 g/dL    Hematocrit 45.7 40.0 - 54.0 %     (H) 80 - 100 fL    MCH 33.5 27.0 - 34.0 pg    MCHC 32.2 32.0 - 36.0 g/dL    RDW 12.7 11.0 - 14.5 %    Platelets 234 140 - 440 thou/uL    MPV 10.4 8.5 - 12.5 fL   Uric Acid   Result Value Ref Range    Uric Acid 6.3 3.0 - 8.0 mg/dL       Assessment:     1. Routine physical examination    2. Screen for colon cancer    3. Essential hypertension    4. Chronic gout of multiple sites, unspecified cause    5. Laparoscopic gastric sleeve operation -  June 2015.    6. ANASTACIO on CPAP    7. Venous insufficiency    8. Primary osteoarthritis of right hip    9. Pain, dental    10. Heart murmur    11. Morbid obesity (H)    12. NSAID long-term use    13. Former smoker    14. Bilateral shoulder pain, unspecified chronicity        Quality review:     PHQ-2 Total Score: 0 (2/10/2020  7:00 AM)    No data recorded  ______________________________________________________________________     BMI Readings from Last 1 Encounters:   02/10/20 40.40 kg/m        Plan:      1. Blood work done today.    2. Given a limited amount of hydrocodone for his pain in his shoulders and hip as needed.  3. Follow-up with dentist as noted.  4. Cologuard ordered.  5. Consider injection to the shoulders if worsening.  6. Work on weight loss in relationship to morbid obesity.  7. Consider follow-up echocardiogram in the future.  8. Continue follow-up with [facility] related to his sleep apnea.  He sees Dr. Mes[place] for this.  We might want to request these records next year.  9. Consider  exam next year and possibly PSA.  10. Continue current medications otherwise.    Noe Beckwith MD  General Internal Medicine  [facility]    Return in about 1 year (around 2/10/2021) for physical.     No future appointments.      ______________________________________________________________________     Relevant ICD-10 codes and order associations:      ICD-10-CM    1. Routine physical examination Z00.00 cyanocobalamin 1000 MCG tablet   2. Screen for colon cancer Z12.11 Cologuard   3. Essential hypertension I10 Lipid [place] FASTING     Basic Metabolic Panel     HM2(CBC w/o Differential)   4. Chronic gout of multiple sites, unspecified cause M1A.09X0 Uric Acid   5. Laparoscopic gastric sleeve operation -  June 2015. Z98.84    6. ANASTACIO on CPAP G47.33     Z99.89    7. Venous insufficiency I87.2    8. Primary osteoarthritis of right hip M16.11 HYDROcodone-acetaminophen 5-325 mg per tablet   9. Pain, dental K08.89    10. Heart murmur R01.1    11. Morbid obesity (H) E66.01    12. NSAID long-term use Z79.1    13. Former smoker Z87.891    14. Bilateral shoulder pain, unspecified chronicity M25.511     M25.512

## 2021-06-30 NOTE — PROGRESS NOTES
Progress Notes by Noe Beckwith MD at 12/22/2020  8:50 AM     Author: Noe Beckwith MD Service: -- Author Type: Physician    Filed: 12/22/2020 12:39 PM Encounter Date: 12/22/2020 Status: Signed    : Noe Beckwith MD (Physician)          [facility]    Care coordination - Customized care -  Comprehensive and complex medical care            Date of Service: 12/22/2020  Primary Provider: Noe Beckwith MD    Patient Care Team:  Noe Beckwith MD as PCP - General (Internal Medicine)  Kiran Laird MD as Physician (General Surgery)  Ho Ya MD as Physician (Orthopedic Surgery)  Noe Beckwith MD as Assigned PCP  Tom Mesa MD as Physician (Pulmonary Disease)     ______________________________________________________________________     Patient's Pharmacy:      [address] [address]  Phone: [phone] Fax: [phone]     Patient's Contacts:  Name Home Phone Work Phone Mobile Phone Relationship Lgl LEIGH Brown [phone] Spouse        Patient's Insurance:    Payor: BLUE CROSS / Plan: BLUE CROSS OUT OF STATE / Product Type: PPO/POS/FFS /          Preoperative examination    Alvin Fitzpatrick is a 55 y.o. male (1965) who I am asked to see by his surgeon regarding his fitness for upcoming surgery.      Chief Complaint   Patient presents with   ? Pre-op Exam     fax [phone], [facility], 1/7/21 Dr. Davis, phacoemulsification/ intraocular lens, left on the 1/21/21 right eye, [facility]        Subjective:     Patient comes in today for preoperative evaluation for his bilateral cataract [facility].    We reviewed his high blood pressure.  His blood pressure has been doing well.  He is taking his medications.  His blood work has been good.    We reviewed his gout and he has had no  issues with this.  He remains on the allopurinol.    He still has issues with venous insufficiency and venous stasis changes.  He gets sores on his legs that are slow to heal when he bumps himself.  He is using 40% urea cream.    His previous echocardiogram was reviewed as below.  We might want to follow-up on this again in the future.    His sleep apnea is stable at this time.  He remains on CPAP.  He benefits from its use.    ______________________________________________________________________     Surgery specific questions:    Anesthesia/family history of complications: No  History of abnormal bleeding: No  Can patient walk a flight of stairs without stopping: Yes  ______________________________________________________________________     We reviewed his other issues noted in the assessment but not specifically addressed in the HPI above.     ______________________________________________________________________     Patient Active Problem List   Diagnosis   ? HTN (hypertension)   ? Gout   ? Venous insufficiency   ? Laparoscopic gastric sleeve operation -  June 2015.   ? ANASTACIO on CPAP   ? Former smoker - Quit in 1986   ? NSAID long-term use   ? Heart murmur   ? Obesity (BMI 35.0-39.9) with comorbidity (H)       Past Surgical History:   Procedure Laterality Date   ? Laparoscopic gastric sleeve surgery   6/12/15   ? [place] TOTAL HIP ARTHROPLASTY Right 5/1/2019    Procedure: RIGHT TOTAL HIP ARTHROPLASTY, DIRECT ANTERIOR APPROACH;  Surgeon: Ho Ya MD;  Location: [facility] OR;  Service: Orthopedics      Social History     Occupational History   ? Occupation:      Employer: [employer]   Tobacco Use   ? Smoking status: Former Smoker   ? Smokeless tobacco: Never Used   Substance and Sexual Activity   ? Alcohol use: No     Comment: rarely   ? Drug use: Yes     Types: Oxycodone   ? Sexual activity: Not on file      Social History     Social History Narrative    Patient of Dr. Beckwith since  "2013.         .      Family History   Problem Relation Age of Onset   ? Osteoporosis Mother    ? Alcoholism Mother    ? Gout Father    ? No Medical Problems Sister    ? No Medical Problems Sister    ? No Medical Problems Sister    ? No Medical Problems Daughter    ? No Medical Problems Daughter    ? No Medical Problems Son       Family history is noncontributory otherwise.    Allergies: Lisinopril and Losartan     Current medications:  Current Outpatient Medications   Medication Sig   ? allopurinoL (ZYLOPRIM) 300 MG tablet TAKE ONE TABLET BY MOUTH EVERY DAY   ? amoxicillin (AMOXIL) 500 MG capsule    ? celecoxib (CELEBREX) 200 MG capsule TAKE ONE CAPSULE BY MOUTH TWICE A DAY AS DIRECTED   ? cyanocobalamin 1000 MCG tablet Take 1 tablet (1,000 mcg total) by mouth daily.   ? MULTIVITAMINS WITH IRON (CHEWABLE MULTI VITAMIN W/IRON ORAL) Take 1 tablet by mouth 2 (two) times a day.          ? torsemide (DEMADEX) 20 MG tablet TAKE ONE TABLET BY MOUTH EVERY DAY       Review of systems:    On ROS, the patient denies any chest pain or pressure.  No shortness of breath.     Objective:     Wt Readings from Last 3 Encounters:   12/22/20 (!) 234 lb 14.4 oz (106.5 kg)   02/10/20 (!) 242 lb 12.8 oz (110.1 kg)   05/02/19 (!) 238 lb 4.8 oz (108.1 kg)         BP Readings from Last 3 Encounters:   12/22/20 132/70   02/10/20 136/84   05/03/19 106/58         /70   Pulse 85   Temp 98.1  F (36.7  C) (Oral)   Ht 5' 4.75\" (1.645 m)   Wt (!) 234 lb 14.4 oz (106.5 kg)   SpO2 95%   BMI 39.39 kg/m     Patient is in no acute distress.  Mood good.  Insight good.  Eyes nonicteric.  Pupils equal.  Ears clear.  Nose clear.  Throat clear.  Neck is supple.  No cervical adenopathy.  No thyromegaly.  Heart is regular rate and rhythm. Stable heart murmur.  Lungs clear to auscultation bilaterally.  Respiratory effort is good.  Abdomen is soft, nontender, no hepatosplenomegaly.  Extremities show chronic venous stasis changes.  There is " scaling with some sores of the lower extremities.  Nothing severe.      Diagnostics:     Results for orders placed or performed in visit on 20   COVID-19 Virus PCR MRF    Specimen: Respiratory   Result Value Ref Range    COVID-19 VIRUS SPECIMEN SOURCE Nasopharyngeal     2019-nCOV Not Detected        Name: AYANA CAMERON  MRN: [id number]  : 1965  Study Date: 2015 01:32 PM  Age: 49 yrs  Gender: Male  Patient Location: [facility]  Reason For Study:    History: Pre-op evaluation  Ordering Physician: ADAL LASSITER  Referring Physician: ADAL LASSITER  Performed By: Yany Liang RDCS    BSA: 2.5 m2  Height: 65 in  Weight: 350 lb  BP: 140/65 mmHg  ______________________________________________________________________________    ______________________________________________________________________________    Interpretation Summary  Normal dobutamine stress echo at target heartrate. No significant valvular  Abnormality.    ______________________________________________________________________       Chemistry        Component Value Date/Time     02/10/2020 0846    K 4.9 02/10/2020 0846    CL 98 02/10/2020 0846    CO2 28 02/10/2020 0846    BUN 20 02/10/2020 0846    CREATININE 0.80 02/10/2020 0846    GLU 86 02/10/2020 0846        Component Value Date/Time    CALCIUM 10.2 02/10/2020 0846    ALKPHOS 79 201921    AST 22 2019    ALT 13 2019    BILITOT 1.0 2019         Impression:     1. Preoperative cardiovascular examination    2. Nuclear senile cataract, bilateral    3. Morbid obesity (H)    4. Essential hypertension    5. Chronic gout of multiple sites, unspecified cause    6. ANASTACIO on CPAP    7. Venous insufficiency    8. Laparoscopic gastric sleeve operation -  2015.    9. Heart murmur    10. Influenza vaccination declined        Quality review:     PHQ-2 Total Score: 0 (2020  9:00 AM)    No data  recorded  ______________________________________________________________________     BMI Readings from Last 1 Encounters:   12/22/20 39.39 kg/m        Plan:     1. Okay to proceed with surgery as planned.  2. Doing well overall.  3. Follow-up again in February for routine physical.  4. Reviewed colon cancer screening again at next visit.  5. Patient declines flu shot.  6. Tetanus update due in November of next year.  7. Information related to shingles shot given today in the AVS.           Noe Beckwith MD  General Internal Medicine  [facility]    Return in about 2 months (around 2/22/2021) for physical.     Future Appointments   Date Time Provider Department Center   2/15/2021  8:40 AM Noe Beckwith MD [facility]         ______________________________________________________________________     Relevant ICD-10 codes and order associations:      ICD-10-CM    1. Preoperative cardiovascular examination  Z01.810    2. Nuclear senile cataract, bilateral  H25.13    3. Morbid obesity (H)  E66.01    4. Essential hypertension  I10    5. Chronic gout of multiple sites, unspecified cause  M1A.09X0    6. ANASTACIO on CPAP  G47.33     Z99.89    7. Venous insufficiency  I87.2    8. Laparoscopic gastric sleeve operation -  June 2015.  Z98.84    9. Heart murmur  R01.1    10. Influenza vaccination declined  Z28.21

## 2021-06-30 NOTE — PROGRESS NOTES
Progress Notes by Noe Beckwith MD at 2/15/2021  8:40 AM     Author: Noe Beckwith MD Service: -- Author Type: Physician    Filed: 2/15/2021  3:46 PM Encounter Date: 2/15/2021 Status: Signed    : Noe Beckwith MD (Physician)                [facility]    Comprehensive and complex medical care - Chronic disease management - Shared decision making - Care coordination - Compassionate care    Patient advocacy - Rational deprescribing - Minimally disruptive medicine - Ethical focus - Customized care          Date of Service: 2/15/2021  Primary Provider: Noe Beckwith MD    Patient Care Team:  Noe Beckwith MD as PCP - General (Internal Medicine)  Kiran Laird MD as Physician (General Surgery)  Ho Ya MD as Physician (Orthopedic Surgery)  Noe Beckwith MD as Assigned PCP  Tom Mesa MD as Physician (Pulmonary Disease)  Rajiv Davis MD as Physician (Ophthalmology)     ______________________________________________________________________     Patient's Pharmacy:    [address] [address]  Phone: [phone] Fax: [phone]     Patient's Contacts:  Name Home Phone Work Phone Mobile Phone Relationship Lgl GrLEIGH Morris [phone] Spouse      Patient's Insurance:    Payor/Plan Subscr  Sex Relation Sub. Ins. ID Effective Group Num   1. YARELI CROSS - * RAKESHALVIN 1965 Male  [id number] Not Eff [id number]                                   [address] [address]   2. YARELI CROSS - * ALVIN CAMERON 1965 Male Self [id number]                                   [address] [address]          Routine physical - male, age 50-65    Alvin Cameron is a 55 y.o. male coming in today for a routine physical.  He does not have other issues outside the physical to discuss as well.    Chief Complaint    Patient presents with   ? Annual Exam        Active Problem List:  Problem List as of 2/15/2021 Reviewed: 12/22/2020 12:12 PM by Noe Beckwith MD       High    Former smoker - Quit in 1986       Medium    HTN (hypertension)    Gout    Laparoscopic gastric sleeve operation -  June 2015.       Low    ANASTACIO on CPAP    Venous insufficiency       Unprioritized    Heart murmur    NSAID long-term use    Obesity (BMI 35.0-39.9) with comorbidity (H)           Past Medical History:   Diagnosis Date   ? Exposure to hepatitis C     wife had treatment for hep c   ? Former smoker    ? Gout    ? HTN (hypertension)    ? Laparoscopic gastric sleeve operation -  June 2015. 7/6/2015   ? Lower extremity venous stasis    ? Morbid obesity (H)    ? ANASTACIO on CPAP     CPAP   ? Osteoarthritis of right hip 8/11/2015   ? Pneumonia     as a child and as a teenager   ? Venous insufficiency      Past Surgical History:   Procedure Laterality Date   ? CATARACT EXTRACTION, BILATERAL Bilateral 12/2020   ? Laparoscopic gastric sleeve surgery  06/12/2015   ? [place] TOTAL HIP ARTHROPLASTY Right 05/01/2019    Procedure: RIGHT TOTAL HIP ARTHROPLASTY, DIRECT ANTERIOR APPROACH;  Surgeon: Ho Ya MD;  Location: [facility] OR;  Service: Orthopedics     Social History     Occupational History   ? Occupation:      Employer: [email or web address]   Tobacco Use   ? Smoking status: Former Smoker   ? Smokeless tobacco: Never Used   Substance and Sexual Activity   ? Alcohol use: No     Comment: rarely   ? Drug use: Yes     Types: Oxycodone   ? Sexual activity: Not on file      Social History     Social History Narrative    Patient of Dr. Beckwith since 2013.         .      Family History   Problem Relation Age of Onset   ? Osteoporosis Mother    ? Alcoholism Mother    ? Gout Father    ? No Medical Problems Sister    ? No Medical Problems Sister    ? No Medical Problems Sister    ? No Medical Problems Daughter    ? No Medical Problems  Daughter    ? No Medical Problems Son      Family history is otherwise noncontributory.    Current Outpatient Medications   Medication Sig   ? allopurinoL (ZYLOPRIM) 300 MG tablet Take 1 tablet (300 mg total) by mouth daily.   ? amoxicillin (AMOXIL) 500 MG capsule    ? celecoxib (CELEBREX) 200 MG capsule TAKE ONE CAPSULE BY MOUTH TWICE A DAY AS DIRECTED   ? cyanocobalamin 1000 MCG tablet Take 1 tablet (1,000 mcg total) by mouth daily.   ? MULTIVITAMINS WITH IRON (CHEWABLE MULTI VITAMIN W/IRON ORAL) Take 1 tablet by mouth 2 (two) times a day.          ? torsemide (DEMADEX) 20 MG tablet Take 1 tablet (20 mg total) by mouth daily.   ? urea (CARMOL) 40 % Crea Apply 1 application topically daily.     Allergies: Lisinopril and Losartan  Immunization History   Administered Date(s) Administered   ? Influenza, seasonal,quad inj 6-35 mos 10/06/2014   ? Influenza,seasonal,quad inj =/> 6months 09/29/2016, 11/06/2017   ? Td, Adult, Absorbed 09/06/2001   ? Td, adult adsorbed, PF 10/26/2007   ? Tdap 11/11/2011             Patient comes in today for routine physical.    He is having ongoing issues with bilateral shoulder pain.  He has had this for a while but it is become more prominent in the left.  He is not asking me to do anything more as far as work-up of this at this time.  He did have an x-ray of his right shoulder in the past which showed no significant arthritis.  He would like a referral to a orthopedist for further evaluation.  We reviewed different options related to this.    We reviewed some issues with his eyes.  His cataract surgery went well.  He does have some issues with blepharitis.    Reviewed tetanus shot and he does not want to do this at this time but could do next year.    His sleep apnea is doing well without issues.    His gout is doing well without issues.    His high blood pressure is doing well without issues.    Related to his venous insufficiency, he would like a prescription for urea cream 40% and  "this has generally worked well for him.    We will do follow-up on his gastric sleeve operation today.    He has a Cologuard for colon cancer screening, but has not done this yet.  He declines colonoscopy at this point.    Reviewed prostate cancer screening and he agrees to get a baseline at this time.  We talked about the possibility of false positives.    Exam:     Wt Readings from Last 3 Encounters:   02/15/21 (!) 236 lb 14.4 oz (107.5 kg)   12/22/20 (!) 234 lb 14.4 oz (106.5 kg)   02/10/20 (!) 242 lb 12.8 oz (110.1 kg)     BP Readings from Last 3 Encounters:   02/15/21 132/60   12/22/20 132/70   02/10/20 136/84     /60   Pulse 84   Ht 5' 5\" (1.651 m)   Wt (!) 236 lb 14.4 oz (107.5 kg)   SpO2 95%   BMI 39.42 kg/m     The patient is in no acute distress.  Mood good.  Insight good.  No skin lesions or nodules of concern.  Ears are clear.  Nose is clear.  Throat is clear.  Neck is supple  and there is no thyromegaly.  No carotid bruits.  No cervical, epitrochlear or axillary adenopathy.  Heart regular rate and rhythm.  Stable 1 out of 6 musical systolic basilar murmur present.  Lungs clear to auscultation bilaterally.  Respiratory effort is good.  Abdomen is soft, nontender.  No hepatosplenomegaly.  Extremities show no edema.  Chronic venous stasis changes present.  Neuro exam shows normal strength in all muscle groups and normal reflexes.      Diagnostics:     Results for orders placed or performed in visit on 02/15/21   HM2(CBC w/o Differential)   Result Value Ref Range    WBC 4.2 4.0 - 11.0 thou/uL    RBC 4.37 (L) 4.40 - 6.20 mill/uL    Hemoglobin 14.9 14.0 - 18.0 g/dL    Hematocrit 43.5 40.0 - 54.0 %     80 - 100 fL    MCH 34.1 (H) 27.0 - 34.0 pg    MCHC 34.3 32.0 - 36.0 g/dL    RDW 12.2 11.0 - 14.5 %    Platelets 214 140 - 440 thou/uL    MPV 10.3 (H) 7.0 - 10.0 fL   Ferritin   Result Value Ref Range    Ferritin 391 (H) 27 - 300 ng/mL   PSA, Annual Screen (Prostatic-Specific Antigen)   Result " Value Ref Range    PSA 0.6 0.0 - 3.5 ng/mL       Assessment:     1. Routine physical examination    2. Gout, unspecified cause, unspecified chronicity, unspecified site    3. Morbid obesity (H)    4. Chronic pain of both shoulders    5. Essential hypertension    6. At risk for anemia    7. Venous stasis of both lower extremities    8. Screening for prostate cancer    9. Colonoscopy refused    10. Laparoscopic gastric sleeve operation -  June 2015.    11. ANASTACIO on CPAP    12. Venous insufficiency    13. NSAID long-term use        Quality review:     PHQ-2 Total Score: 0 (2/15/2021  8:00 AM)    No data recorded  ______________________________________________________________________     BMI Readings from Last 1 Encounters:   02/15/21 39.42 kg/m        Plan:     1. Blood work done today.  2. Work on weight loss in relationship to obesity.  3. Continue current medications.  4. Follow-up sooner if issues.  5. Referral to Dr. Molina at [facility] orthopedics.  6. Recommended that he proceed with Cologuard testing.  7. Tetanus shot next year.    Noe Beckwith MD  General Internal Medicine  [facility]    Return in about 1 year (around 2/15/2022), or if symptoms worsen or fail to improve, for physical.     No future appointments.      ______________________________________________________________________     Relevant ICD-10 codes and order associations:      ICD-10-CM    1. Routine physical examination  Z00.00 cyanocobalamin 1000 MCG tablet   2. Gout, unspecified cause, unspecified chronicity, unspecified site  M10.9 allopurinoL (ZYLOPRIM) 300 MG tablet     Uric Acid   3. Morbid obesity (H)  E66.01    4. Chronic pain of both shoulders  M25.511 Ambulatory referral to Orthopedics    G89.29     M25.512    5. Essential hypertension  I10 Basic Metabolic Panel   6. At risk for anemia  Z91.89 HM2(CBC w/o Differential)     Ferritin   7. Venous stasis of both lower extremities  I87.8 urea (CARMOL) 40 %  Crea   8. Screening for prostate cancer  Z12.5 PSA, Annual Screen (Prostatic-Specific Antigen)   9. Colonoscopy refused  Z53.20    10. Laparoscopic gastric sleeve operation -  June 2015.  Z98.84    11. ANASTACIO on CPAP  G47.33     Z99.89    12. Venous insufficiency  I87.2    13. NSAID long-term use  Z79.1

## 2021-07-30 DIAGNOSIS — I10 ESSENTIAL HYPERTENSION: Primary | ICD-10-CM

## 2021-07-30 RX ORDER — TORSEMIDE 20 MG/1
TABLET ORAL
Qty: 90 TABLET | Refills: 1 | Status: SHIPPED | OUTPATIENT
Start: 2021-07-30 | End: 2022-11-14

## 2021-08-03 PROBLEM — E66.01 MORBID OBESITY (H): Status: RESOLVED | Noted: 2019-01-10 | Resolved: 2020-12-22

## 2021-10-03 ENCOUNTER — HEALTH MAINTENANCE LETTER (OUTPATIENT)
Age: 56
End: 2021-10-03

## 2022-03-20 ENCOUNTER — HEALTH MAINTENANCE LETTER (OUTPATIENT)
Age: 57
End: 2022-03-20

## 2022-09-11 ENCOUNTER — HEALTH MAINTENANCE LETTER (OUTPATIENT)
Age: 57
End: 2022-09-11

## 2022-09-12 ENCOUNTER — NURSE TRIAGE (OUTPATIENT)
Dept: NURSING | Facility: CLINIC | Age: 57
End: 2022-09-12

## 2022-09-12 NOTE — TELEPHONE ENCOUNTER
Can offer an appointment tomorrow at 11:15 am or 4 pm.  Otherwise, can be seen in [facility]ALK IN CARE sooner if needed.    Noe Beckwith MD  General Internal Medicine  [facility]  9/12/2022, 3:58 PM

## 2022-09-12 NOTE — TELEPHONE ENCOUNTER
Patient Returning Call    Reason for call:  Missed call - returning call    Information relayed to patient:  Yes patient declined WIC as possible solution.  Scheduled tomorrow 11:15 with PCP.

## 2022-09-12 NOTE — TELEPHONE ENCOUNTER
TRIAGE CALL - Is this a 2nd Level Triage? NO    Nurse Triage SBAR - Patient calling   Situation:  blood on the sputum  Background:    2 weeks ago - with cold - coughing is lingering  C-pap  Traveling last week   Does not think it is / was COVID  Assessment:  Dry cough   Early in the AM - sputum has blood - in the last 2 days  Maybe a half of a tea spoon, trace of blood.  Pain back - lower Right hand side (near the kidney)   Able to take a deep breath  Pain is not present right now  Pain felt at night when lay dwon  Patients denies difficulty with breathing, chest pain, dizziness.  Patients denies fever, vomiting.  Measures taken: None  Patient is able to speak in full long sentences without getting short of breath.  Recommended Disposition per protocol - Appt tomorrow or [facility]  Pt s PCP/Clinic:  Noe Beckwith MD  [facility]  Pt transferred to [facility]  Care advise given and caller s questions were answered  Reminded we will be here 24/7 and can call back and ask to speak with a nurse.   Noemy Calvert RN [place] Nurse Advisor,  3:51 PM 9/12/2022    Reason for Disposition    Nasal discharge and present > 10 days    Additional Information    Negative: SEVERE difficulty breathing (e.g., struggling for each breath, speaks in single words)    Negative: Chest pain and difficulty breathing    Negative: Bluish (or gray) lips or face now    Negative: Passed out (i.e., lost consciousness, collapsed and was not responding)    Negative: Shock suspected (e.g., cold/pale/clammy skin, too weak to stand, low BP, rapid pulse)    Negative: Difficult to awaken or acting confused (e.g., disoriented, slurred speech)    Negative: Recent chest injury (i.e., past 24 hours)    Negative: Coughed up blood and large amount (Such as: 'a half cup of blood')    Negative: Sounds like a life-threatening emergency to the triager    Negative: MODERATE difficulty breathing (e.g., speaks in phrases, SOB even at rest, pulse  100-120) and still present when not coughing    Negative: Chest pain    Negative: Unclear to triager if the patient is coughing up blood or vomiting blood    Negative: History of prior 'blood clot' in leg or lungs (i.e., deep vein thrombosis, pulmonary embolism)    Negative: History of inherited increased risk of blood clots (e.g., Factor 5 Leiden, Anti-thrombin 3, Protein C or Protein S deficiency, Prothrombin mutation)    Negative: Pregnant or postpartum (from 0 to 6 weeks after delivery)    Negative: Hip or leg fracture (broken bone) in past month (or had cast on leg or ankle in past month)    Negative: Long-distance travel in past month (e.g., car, bus, train, plane; with trip lasting 6 or more hours)    Negative: Bedridden (e.g., nursing home patient, CVA, chronic illness, recovering from surgery)    Negative: Patient sounds very sick or weak to the triager    Negative: MILD difficulty breathing (e.g., minimal/no SOB at rest, SOB with walking, pulse <100) and still present when not coughing (Exception: No change from usual, chronic shortness of breath.)    Negative: Coughed up blood and > 1 tablespoon (15 ml)  (Exception: Blood-tinged sputum.)    Negative: Fever > 103 F (39.4 C)    Negative: Fever > 101 F (38.3 C) and age > 60 years    Negative: Fever > 100.0 F (37.8 C) and diabetes mellitus or weak immune system (e.g., HIV positive, cancer chemo, splenectomy, organ transplant, chronic steroids)    Negative: Has underlying lung disease (e.g., COPD, chronic bronchitis or emphysema) and sputum has turned yellow or green in color    Negative: Coughing up efren-colored sputum    Negative: Coughing up yellow or green sputum and present > 5 days    Negative: Fever present > 3 days (72 hours)    Negative: Taking Coumadin (warfarin) or other strong blood thinner, or known bleeding disorder (e.g., thrombocytopenia)    Negative: Patient wants to be seen    Protocols used: COUGHING UP BLOOD-A-OH

## 2022-09-13 ENCOUNTER — HOSPITAL ENCOUNTER (OUTPATIENT)
Dept: GENERAL RADIOLOGY | Facility: HOSPITAL | Age: 57
Discharge: HOME | End: 2022-09-13
Attending: INTERNAL MEDICINE | Admitting: INTERNAL MEDICINE
Payer: COMMERCIAL

## 2022-09-13 ENCOUNTER — OFFICE VISIT (OUTPATIENT)
Dept: INTERNAL MEDICINE | Facility: CLINIC | Age: 57
End: 2022-09-13
Payer: COMMERCIAL

## 2022-09-13 VITALS
SYSTOLIC BLOOD PRESSURE: 139 MMHG | HEART RATE: 67 BPM | WEIGHT: 247.2 LBS | OXYGEN SATURATION: 98 % | DIASTOLIC BLOOD PRESSURE: 88 MMHG | HEIGHT: 65 IN | BODY MASS INDEX: 41.19 KG/M2 | TEMPERATURE: 97.7 F

## 2022-09-13 DIAGNOSIS — R91.1 RIGHT UPPER LOBE PULMONARY NODULE: ICD-10-CM

## 2022-09-13 DIAGNOSIS — R04.2 HEMOPTYSIS: Primary | ICD-10-CM

## 2022-09-13 DIAGNOSIS — R04.2 HEMOPTYSIS: ICD-10-CM

## 2022-09-13 DIAGNOSIS — Z98.84 STATUS POST LAPAROSCOPIC SLEEVE GASTRECTOMY: ICD-10-CM

## 2022-09-13 DIAGNOSIS — M54.6 ACUTE RIGHT-SIDED THORACIC BACK PAIN: ICD-10-CM

## 2022-09-13 DIAGNOSIS — Z86.19 HISTORY OF VIRAL ILLNESS: ICD-10-CM

## 2022-09-13 PROBLEM — Z87.891 FORMER SMOKER: Chronic | Status: ACTIVE | Noted: 2022-09-13

## 2022-09-13 PROBLEM — R01.1 HEART MURMUR: Status: ACTIVE | Noted: 2020-02-10

## 2022-09-13 PROBLEM — Z79.1 NSAID LONG-TERM USE: Status: ACTIVE | Noted: 2019-01-12

## 2022-09-13 PROBLEM — Z87.891 FORMER SMOKER: Status: ACTIVE | Noted: 2022-09-13

## 2022-09-13 PROBLEM — G47.33 OSA ON CPAP: Status: ACTIVE | Noted: 2022-09-13

## 2022-09-13 PROBLEM — I10 HTN (HYPERTENSION): Status: ACTIVE | Noted: 2022-09-13

## 2022-09-13 PROCEDURE — 71046 X-RAY EXAM CHEST 2 VIEWS: CPT | Mod: FY

## 2022-09-13 PROCEDURE — 99214 OFFICE O/P EST MOD 30 MIN: CPT | Performed by: INTERNAL MEDICINE

## 2022-09-13 ASSESSMENT — PAIN SCALES - GENERAL: PAINLEVEL: NO PAIN (0)

## 2022-09-13 NOTE — PROGRESS NOTES
Wt Readings from Last 20 Encounters:   09/13/22 112.1 kg (247 lb 3.2 oz)   02/15/21 107.5 kg (236 lb 14.4 oz)   12/22/20 106.5 kg (234 lb 14.4 oz)   02/10/20 110.1 kg (242 lb 12.8 oz)   05/02/19 108.1 kg (238 lb 4.8 oz)   04/22/19 108.9 kg (240 lb)   01/11/19 109.4 kg (241 lb 3.2 oz)   11/06/17 103.4 kg (227 lb 14.4 oz)   09/06/17 98.9 kg (218 lb)   10/07/16 96.2 kg (212 lb 1.6 oz)   09/29/16 95.7 kg (211 lb)   01/27/16 109.5 kg (241 lb 6.4 oz)   12/14/15 116.1 kg (256 lb)   12/03/15 116.1 kg (256 lb)   10/02/15 123.7 kg (272 lb 9.6 oz)   10/02/15 123.9 kg (273 lb 3.2 oz)   08/11/15 133.4 kg (294 lb)   07/30/15 (!) 136.2 kg (300 lb 4.8 oz)   07/09/15 (!) 147.2 kg (324 lb 8 oz)   06/29/15 (!) 154.6 kg (340 lb 13.3 oz)     BP Readings from Last 20 Encounters:   09/13/22 139/88   02/15/21 132/60   12/22/20 132/70   02/10/20 136/84   09/06/17 125/69   10/07/16 115/69   01/27/16 106/66   10/02/15 110/68   07/30/15 112/64   07/09/15 117/64   07/01/15 145/78   06/17/15 119/75   06/12/15 140/65   06/12/15 127/70   06/02/15 130/79   05/05/15 114/79   03/25/15 134/90   03/20/15 130/75   11/21/14 134/82   11/17/14 137/87      Pulse Readings from Last 20 Encounters:   09/13/22 67   02/15/21 84   12/22/20 85   02/10/20 83   09/06/17 58   10/07/16 56   01/27/16 62   10/02/15 66   07/30/15 82   07/09/15 76   07/01/15 96   06/17/15 87   06/12/15 77   06/12/15 87   06/02/15 74   05/05/15 87   03/25/15 101   03/20/15 88   11/21/14 85   11/17/14 83     SpO2 Readings from Last 20 Encounters:   02/15/21 95%   12/22/20 95%   02/10/20 100%   09/06/17 100%   10/07/16 98%   01/27/16 98%   10/02/15 100%   07/30/15 99%   07/09/15 99%   07/01/15 92%   06/17/15 98%   06/12/15 100%   06/02/15 96%   05/05/15 97%   03/25/15 92%   03/20/15 96%   11/21/14 94%   11/17/14 90%

## 2022-09-13 NOTE — PROGRESS NOTES
[facility] - [facility]ists    Comprehensive and complex medical care - Chronic disease management - Shared decision making - Care coordination - Compassionate care    Patient advocacy - Rational deprescribing - Minimally disruptive medicine - Ethical focus - Customized care         Date of Service: 9/13/2022  Primary Provider: Noe Beckwith    Patient Care Team:  Noe Beckwith MD as PCP - General (Internal Medicine)  Kiran Laird MD as Referring Physician (General Surgery)  Noe Beckwith MD as Assigned PCP  Kiran Laird MD as Surgeon (Surgery)  Rajiv Davis (Ophthalmology)  Ho Ya MD as MD (Orthopaedic Surgery)          Patient's Pharmacy:    [facility]/pharmacy #[id number] [place], [place] - [address]  [address] [facility] [address]  Phone: [phone] Fax: [phone]    [address] [address]  Phone: [phone] Fax: [phone]     Patient's Contacts:  Name Home Phone Work Phone Mobile Phone Relationship Lgl Grd   LEIGH CAMERON [phone] NONE [phone] Spouse    RAKESH BLANK NONE NONE NONE Father      Patient's Insurance:    Payor: BCBS / Plan: BCBS OUT OF STATE / Product Type: Indemnity /           Active Problem List:  Problem List as of 9/13/2022 Reviewed: 9/13/2022  8:38 PM by Noe Beckwith MD       High    Former smoker - quit in 1986       Medium    HTN (hypertension)    Gout    Status post laparoscopic sleeve gastrectomy       Low    Venous insufficiency    Morbid obesity (H)    Heart murmur    NSAID long-term use    ANASTACIO on CPAP           Current Outpatient Medications   Medication Instructions     allopurinol (ZYLOPRIM) 300 mg, Oral, DAILY     Calcium Citrate-Vitamin D (CALCIUM CITRATE + PO) 1,200-1,500 tablets, Oral     Celecoxib (CELEBREX PO) Oral     Cyanocobalamin (B-12) 1000 MCG SUBL Sublingual     multivitamin  "w/minerals (THERA-VIT-M) tablet 1 tablet, Oral, DAILY     torsemide (DEMADEX) 20 MG tablet TAKE ONE TABLET BY MOUTH EVERY DAY     Social History     Social History Narrative    Patient of Dr. Beckwith since 2013.     .       Subjective:     Alvin Fitzpatrick is a 56 year old male who comes in today for:    Chief Complaint   Patient presents with     Back Pain     Blood in throat in morning      Comes in today for evaluation of hemoptysis.    Had a viral type illness starting about 3 weeks ago.  Coughing and sneezing.  COVID negative.  After about 1.5 weeks, went away.    Has been having a lingering am cough with this usually dry.    Has recently had some coughing up of blood in the am when first up and brushing teeth.  Get a dime sized spot and we looked at his phone pictures of this.  Had for about 3 days, then off for a few days and now again in the last 2 days.    No shortness of breath and no fever or chills.  No weight change.    Also recently has noted some right lower thoracic paraspinous pain.  This is mild and nagging.  No related to breathing.  More of a constant discomfort.  Not escalating.    We reviewed his other issues noted in the assessment but not specifically addressed in the HPI above.     Objective:     Wt Readings from Last 3 Encounters:   09/13/22 112.1 kg (247 lb 3.2 oz)   02/15/21 107.5 kg (236 lb 14.4 oz)   12/22/20 106.5 kg (234 lb 14.4 oz)     BP Readings from Last 3 Encounters:   09/13/22 139/88   02/15/21 132/60   12/22/20 132/70     /88 (BP Location: Right arm, Patient Position: Left side, Cuff Size: Adult Large)   Pulse 67   Temp 97.7  F (36.5  C) (Temporal)   Ht 1.651 m (5' 5\")   Wt 112.1 kg (247 lb 3.2 oz)   SpO2 98%   BMI 41.14 kg/m     The patient is comfortable, no acute distress.  Mood good.  Insight good.  Eyes are nonicteric. Nose is clear.  Oropharynx is clear.    Neck is supple without mass.  No cervical adenopathy.  No thyromegaly. Heart regular rate and " rhythm.  Lungs clear to auscultation bilaterally.  Respiratory effort is good.  Some mild paraspinous tenderness on the right with palpation.   Extremities stable edema.      Diagnostics:     [facility]   Component Date Value Ref Range Status     WBC 02/15/2021 4.2  4.0 - 11.0 thou/uL Final     RBC Count 02/15/2021 4.37 (A) 4.40 - 6.20 mill/uL Final     Hemoglobin 02/15/2021 14.9  14.0 - 18.0 g/dL Final     Hematocrit 02/15/2021 43.5  40.0 - 54.0 % Final     MCV 02/15/2021 100  80 - 100 fL Final     MCH 02/15/2021 34.1 (A) 27.0 - 34.0 pg Final     MCHC 02/15/2021 34.3  32.0 - 36.0 g/dL Final     RDW 02/15/2021 12.2  11.0 - 14.5 % Final     Platelet Count 02/15/2021 214  140 - 440 thou/uL Final     Mean Platelet Volume 02/15/2021 10.3 (A) 7.0 - 10.0 fL Final     Ferritin 02/15/2021 391 (A) 27 - 300 ng/mL Final     Sodium 02/15/2021 142  136 - 145 mmol/L Final     Potassium 02/15/2021 4.3  3.5 - 5.0 mmol/L Final     Chloride 02/15/2021 99  98 - 107 mmol/L Final     Carbon Dioxide (CO2) 02/15/2021 31  22 - 31 mmol/L Final     Anion Gap 02/15/2021 12  5 - 18 mmol/L Final     Glucose 02/15/2021 75  70 - 125 mg/dL Final     Calcium 02/15/2021 9.6  8.5 - 10.5 mg/dL Final     Urea Nitrogen 02/15/2021 21  8 - 22 mg/dL Final     Creatinine 02/15/2021 0.73  0.70 - 1.30 mg/dL Final     GFR Estimate If Black 02/15/2021 >60  >60 mL/min/1.73m2 Final     GFR Estimate 02/15/2021 >60  >60 mL/min/1.73m2 Final     Uric Acid 02/15/2021 6.9  3.0 - 8.0 mg/dL Final     Prostate Specific Antigen Screen 02/15/2021 0.6  0.0 - 3.5 ng/mL Final       Results for orders placed or performed during the hospital encounter of 09/13/22   XR Chest 2 Views     Status: None    Narrative    EXAM: XR CHEST 2 VIEWS  LOCATION: [facility]  DATE/TIME: 9/13/2022 12:29 PM    INDICATION:  Hemoptysis  COMPARISON: None.      Impression    IMPRESSION: Vague opacity identified at the inferior aspect of the right  upper lobe measuring 11 mm in size. CT scan of the chest may be of benefit in further evaluating this finding. The heart is normal in size. Minimally increased opacities are   identified in the left lung base. No pleural effusion or pneumothorax.       Assessment:     1. Hemoptysis    2. Right upper lobe pulmonary nodule    3. History of viral illness    4. Acute right-sided thoracic back pain    5. Status post laparoscopic sleeve gastrectomy        Plan:     1. Chest x-ray (CXR) done today and noted.  2. Plan CT scan of the chest and the patient was notified of the results.  3. Hemoptysis seems more posterior oropharyngeal in nature, but still need to progress with work-up.  4. Back possibly related to coughing, suspect musculoskeletal.  5. Continue current medications otherwise.  6. Follow up sooner if issues.    Orders Placed This Encounter   Procedures     XR Chest 2 Views           Noe Beckwith MD  General Internal Medicine  [facility]    Return in about 8 weeks (around 11/10/2022), or if symptoms worsen or fail to improve, for physical.     Future Appointments   Date Time Provider Department Center   11/10/2022  1:00 PM Noe Beckwith MD [facility]

## 2022-09-14 ENCOUNTER — HOSPITAL ENCOUNTER (OUTPATIENT)
Dept: CT IMAGING | Facility: CLINIC | Age: 57
Discharge: HOME | End: 2022-09-14
Attending: INTERNAL MEDICINE | Admitting: INTERNAL MEDICINE
Payer: COMMERCIAL

## 2022-09-14 DIAGNOSIS — R04.2 HEMOPTYSIS: ICD-10-CM

## 2022-09-14 DIAGNOSIS — R91.1 RIGHT UPPER LOBE PULMONARY NODULE: ICD-10-CM

## 2022-09-14 PROCEDURE — 71250 CT THORAX DX C-: CPT

## 2022-09-14 NOTE — PATIENT INSTRUCTIONS
Future Appointments   Date Time Provider Department Center   11/10/2022  1:00 PM [facility]

## 2022-09-15 ENCOUNTER — TELEPHONE (OUTPATIENT)
Dept: INTERNAL MEDICINE | Facility: CLINIC | Age: 57
End: 2022-09-15

## 2022-09-15 ENCOUNTER — MYC MEDICAL ADVICE (OUTPATIENT)
Dept: INTERNAL MEDICINE | Facility: CLINIC | Age: 57
End: 2022-09-15

## 2022-09-15 DIAGNOSIS — I26.99 PULMONARY INFARCT (H): Primary | ICD-10-CM

## 2022-09-15 NOTE — TELEPHONE ENCOUNTER
Called patient -    ______________________________________________________________________     Home phone:  [phone] (home)     Cell phone:   Telephone Information:   Mobile [phone]   Mobile [phone]       Other contacts:  Name Home Phone Work Phone Mobile Phone Relationship Lgl Grd   LEIGH CAMERON [phone] NONE [phone] Spouse    ROSAMARIA CAMERON NONE NONE NONE Father       ______________________________________________________________________     I called the patient this evening.  His CT was surprising.  I was able to review this in detail today.    There are signs of small peripheral pulmonary infarcts based on the radiologist review.    The CT was not done with a pulmonary angiogram and we will order the pulmonary angiogram to see if there are active clots present.  I will order an ultrasound of his lower extremities as well to look for venous thromboembolism.    We started him on Xarelto this evening with the starter pack.  Hopefully his insurance will cover this.    He is not having blood in his sputum over the last 2 days.    He will be scheduled for follow-up again in 1 week and we messaged him regarding this.  He will is aware about the need for follow-up.  Further blood work may be required.    Noe Beckwith MD  [facility]  9/15/2022, 11:29 AM     ______________________________________________________________________     Pertinent radiology for this visit includes the following:    CT Chest w/o Contrast  Narrative: EXAM: CT CHEST W/O CONTRAST  LOCATION: [facility]  DATE/TIME: 9/14/2022 10:47 AM    INDICATION: HEMOPTYSIS.  RIGHT LUNG NODULE ON XRAY.  Please do in the next 1 3 days.  COMPARISON: Chest radiographs 10/13/2022  TECHNIQUE: CT chest without IV contrast. Multiplanar reformats were obtained. Dose reduction techniques were used.  CONTRAST: None.    FINDINGS:   LUNGS AND PLEURA: There are 2 fairly circumscribed wedge-shaped  "subpleural opacities in the right lung, more anterior resides along the lateral pleura and minor fissure measuring 13 x 19 mm (series 5, image 84) and the posterior nodule is in the right   lower lobe superior segment and abuts the lateral pleura and major fissure measuring 15 x 16 mm. Several other foci of focal subpleural scarring are present in the basal right lower lobe and posterior basal left lower lobe. Trachea and central airways   are patent. Mild cylindrical bronchiectasis of a subset of peripheral airways in the anterior inferior right middle lobe. No bronchiectasis or bronchial wall thickening elsewhere.    MEDIASTINUM: Cardiac chambers are normal in size. There is degenerative calcification of the aortic valve leaflets. No pericardial effusion. The main pulmonary artery is normal caliber. No pulmonary artery calcifications. Normal caliber thoracic aorta. 2   vessel arch anatomy. No enlarged mediastinal or hilar lymph nodes. Status post gastric sleeve. Small hiatal hernia. Esophagus is decompressed.    CORONARY ARTERY CALCIFICATION: Moderate.    UPPER ABDOMEN: No actionable findings in the imaged upper abdomen.    MUSCULOSKELETAL: Small diffuse thoracic spine degenerative osteophytes. No aggressive or destructive bone lesions.  Impression: IMPRESSION:     Two wedge-shaped subpleural opacities in the right lung cysts along the costal pleura and interlobar fissures. Which correspond to the \"nodule\" described on recent chest radiographs. Other smaller territories of subpleural scarring in the lower lobes,   greater on the right. Leading consideration is mature pulmonary infarcts and related scarring. Other, less likely differential considerations include infection (such as endemic fungal) or vasculitis (GPA).      ______________________________________________________________________   "

## 2022-09-15 NOTE — TELEPHONE ENCOUNTER
Please schedule this patient for an appointment with me on:    ______________________________________________________________________     Friday, September 23rd  Time of appointment:  10:30 am    Reason for appointment:  Follow up pulmonary infarct.    ______________________________________________________________________     Patient already notified.  No need to notify the patient.    Thank you.    Noe Beckwith MD  General Internal Medicine  [facility]   9/15/2022 6:30 PM

## 2022-09-16 ENCOUNTER — HOSPITAL ENCOUNTER (OUTPATIENT)
Dept: ULTRASOUND IMAGING | Facility: CLINIC | Age: 57
Discharge: HOME | End: 2022-09-16
Attending: INTERNAL MEDICINE
Payer: COMMERCIAL

## 2022-09-16 ENCOUNTER — HOSPITAL ENCOUNTER (OUTPATIENT)
Dept: CT IMAGING | Facility: CLINIC | Age: 57
Discharge: HOME | End: 2022-09-16
Attending: INTERNAL MEDICINE
Payer: COMMERCIAL

## 2022-09-16 DIAGNOSIS — I26.99 PULMONARY INFARCT (H): ICD-10-CM

## 2022-09-16 PROCEDURE — 250N000011 HC RX IP 250 OP 636: Performed by: INTERNAL MEDICINE

## 2022-09-16 PROCEDURE — 93970 EXTREMITY STUDY: CPT

## 2022-09-16 PROCEDURE — 71275 CT ANGIOGRAPHY CHEST: CPT

## 2022-09-16 RX ORDER — IOPAMIDOL 755 MG/ML
100 INJECTION, SOLUTION INTRAVASCULAR ONCE
Status: COMPLETED | OUTPATIENT
Start: 2022-09-16 | End: 2022-09-16

## 2022-09-16 RX ADMIN — IOPAMIDOL 100 ML: 755 INJECTION, SOLUTION INTRAVENOUS at 20:12

## 2022-09-23 ENCOUNTER — OFFICE VISIT (OUTPATIENT)
Dept: INTERNAL MEDICINE | Facility: CLINIC | Age: 57
End: 2022-09-23
Payer: COMMERCIAL

## 2022-09-23 VITALS
WEIGHT: 238 LBS | OXYGEN SATURATION: 100 % | TEMPERATURE: 98.3 F | HEIGHT: 65 IN | HEART RATE: 62 BPM | BODY MASS INDEX: 39.65 KG/M2 | DIASTOLIC BLOOD PRESSURE: 82 MMHG | SYSTOLIC BLOOD PRESSURE: 141 MMHG

## 2022-09-23 DIAGNOSIS — M54.6 ACUTE RIGHT-SIDED THORACIC BACK PAIN: Primary | ICD-10-CM

## 2022-09-23 DIAGNOSIS — R93.89 ABNORMAL CT OF THE CHEST: ICD-10-CM

## 2022-09-23 PROCEDURE — 99214 OFFICE O/P EST MOD 30 MIN: CPT | Performed by: INTERNAL MEDICINE

## 2022-09-23 ASSESSMENT — PAIN SCALES - GENERAL: PAINLEVEL: NO PAIN (0)

## 2022-09-23 NOTE — PROGRESS NOTES
[facility] - [facility]    Comprehensive and complex medical care - Chronic disease management - Shared decision making - Care coordination - Compassionate care    Patient advocacy - Rational deprescribing - Minimally disruptive medicine - Ethical focus - Customized care         Date of Service: 9/23/2022  Primary Provider: Noe Beckwith    Patient Care Team:  oNe Beckwith MD as PCP - General (Internal Medicine)  Kiran Laird MD as Referring Physician (General Surgery)  Noe Beckwith MD as Assigned PCP  Kiran Laird MD as Surgeon (Surgery)  Rajiv Davis (Ophthalmology)  Ho Ya MD as MD (Orthopaedic Surgery)          Patient's Pharmacy:    [facility] [address]  Phone: [phone] Fax: [phone]     Patient's Contacts:  Name Home Phone Work Phone Mobile Phone Relationship Lgl Grd   LEIGH CAMERON [phone] NONE [phone] Spouse    RAKESHDOBLANK NONE NONE NONE Father      Patient's Insurance:    Payor: BCBS / Plan: BCBS OUT OF STATE / Product Type: Indemnity /            Active Problem List:  Problem List as of 9/23/2022 Reviewed: 9/23/2022 10:50 AM by Noe Beckwith MD       High    Former smoker - quit in 1986       Medium    HTN (hypertension)    Status post laparoscopic sleeve gastrectomy       Low    Venous insufficiency    Morbid obesity (H)    Heart murmur    NSAID long-term use    ANASTACIO on CPAP       Other    Gout           Current Outpatient Medications   Medication Instructions     allopurinol (ZYLOPRIM) 300 mg, Oral, DAILY     Calcium Citrate-Vitamin D (CALCIUM CITRATE + PO) 1,200-1,500 tablets, Oral     Celecoxib (CELEBREX PO) Oral     Cyanocobalamin (B-12) 1000 MCG SUBL Sublingual     multivitamin w/minerals (THERA-VIT-M) tablet 1 tablet, Oral, DAILY     torsemide (DEMADEX) 20 MG tablet TAKE ONE TABLET BY MOUTH EVERY DAY  "    Social History     Social History Narrative    Patient of Dr. Beckwith since 2013.     .       Subjective:     Alvin Fitzpatrick is a 56 year old male who comes in today for:    Chief Complaint   Patient presents with     RECHECK     Pulmonary issue      Comes in today for follow up CT scan.    We reviewed his different tests.  There was a hint of possible pulmonary infarcts of the lung.  CT scan of the chest and ultrasound of the legs were negative for pulmonary embolism (PE).  He is not on anticoagulation at this time.    We reviewed different options related to this.  He has no systemic symptoms at this time.  We reviewed the CT scan on the computer today.    We discussed options including watchful waiting, further blood testing, follow up CT scan in the future and pulmonary consultation.  He would like to wait at this time and consider a follow up CT scan in the future.    Thoracic back pain is improved at this time.  Not really there but can sense something.    No worsening of symptoms.  Generally better overall.  No other systemic symptoms of concern leading up to this.    No concerning family history of blood clots.  No further hemoptysis.    We reviewed his other issues noted in the assessment but not specifically addressed in the HPI above.     Objective:     Wt Readings from Last 3 Encounters:   09/23/22 108 kg (238 lb)   09/13/22 112.1 kg (247 lb 3.2 oz)   02/15/21 107.5 kg (236 lb 14.4 oz)     BP Readings from Last 3 Encounters:   09/23/22 (!) 141/82   09/13/22 139/88   02/15/21 132/60     BP (!) 141/82 (BP Location: Right arm, Patient Position: Left side, Cuff Size: Adult Regular)   Pulse 62   Temp 98.3  F (36.8  C) (Oral)   Ht 1.651 m (5' 5\")   Wt 108 kg (238 lb)   SpO2 100%   BMI 39.61 kg/m     The patient is comfortable, no acute distress.  Mood good.  Insight good.  Eyes are nonicteric.  Neck is supple without mass.  No cervical adenopathy.  No thyromegaly. Heart regular rate and " rhythm.  Lungs clear to auscultation bilaterally.  Respiratory effort is good.  Extremities trace edema.    Diagnostics:     [facility]   Component Date Value Ref Range Status     WBC 02/15/2021 4.2  4.0 - 11.0 thou/uL Final     RBC Count 02/15/2021 4.37 (A) 4.40 - 6.20 mill/uL Final     Hemoglobin 02/15/2021 14.9  14.0 - 18.0 g/dL Final     Hematocrit 02/15/2021 43.5  40.0 - 54.0 % Final     MCV 02/15/2021 100  80 - 100 fL Final     MCH 02/15/2021 34.1 (A) 27.0 - 34.0 pg Final     MCHC 02/15/2021 34.3  32.0 - 36.0 g/dL Final     RDW 02/15/2021 12.2  11.0 - 14.5 % Final     Platelet Count 02/15/2021 214  140 - 440 thou/uL Final     Mean Platelet Volume 02/15/2021 10.3 (A) 7.0 - 10.0 fL Final     Ferritin 02/15/2021 391 (A) 27 - 300 ng/mL Final     Sodium 02/15/2021 142  136 - 145 mmol/L Final     Potassium 02/15/2021 4.3  3.5 - 5.0 mmol/L Final     Chloride 02/15/2021 99  98 - 107 mmol/L Final     Carbon Dioxide (CO2) 02/15/2021 31  22 - 31 mmol/L Final     Anion Gap 02/15/2021 12  5 - 18 mmol/L Final     Glucose 02/15/2021 75  70 - 125 mg/dL Final     Calcium 02/15/2021 9.6  8.5 - 10.5 mg/dL Final     Urea Nitrogen 02/15/2021 21  8 - 22 mg/dL Final     Creatinine 02/15/2021 0.73  0.70 - 1.30 mg/dL Final     GFR Estimate If Black 02/15/2021 >60  >60 mL/min/1.73m2 Final     GFR Estimate 02/15/2021 >60  >60 mL/min/1.73m2 Final     Uric Acid 02/15/2021 6.9  3.0 - 8.0 mg/dL Final     Prostate Specific Antigen Screen 02/15/2021 0.6  0.0 - 3.5 ng/mL Final        No results found for any visits on 09/23/22.    Assessment:     1. Acute right-sided thoracic back pain    2. Abnormal CT of the chest         Plan:     1. Follow clinically at this time.  2. Consider follow up CT scan later this year.  3. Consider further blood work in the meantime if needed.  4. Physical in November and consider blood work at that time as well.  5. Continue current medications otherwise.  6. Follow up sooner if  issues.    30 minutes or greater was spent today on the patient's care on the day of service.      This includes time for chart preparation, reviewing medical tests done before or during the visit, talking with the patient, review of quality indicators, required documentation, and other elements of care.      Noe Beckwith MD  General Internal Medicine  [facility]    Return in about 7 weeks (around 11/14/2022), or if symptoms worsen or fail to improve, for physical.     Future Appointments   Date Time Provider Department Center   11/14/2022  8:00 AM Noe Beckwith MD [facility]

## 2022-09-25 NOTE — PATIENT INSTRUCTIONS
Future Appointments   Date Time Provider Department Center   11/14/2022  8:00 AM [facility]

## 2022-10-17 ENCOUNTER — NURSE TRIAGE (OUTPATIENT)
Dept: NURSING | Facility: CLINIC | Age: 57
End: 2022-10-17

## 2022-10-17 ENCOUNTER — HOSPITAL ENCOUNTER (OUTPATIENT)
Dept: CT IMAGING | Facility: HOSPITAL | Age: 57
Discharge: HOME | End: 2022-10-17
Attending: INTERNAL MEDICINE
Payer: COMMERCIAL

## 2022-10-17 ENCOUNTER — MYC MEDICAL ADVICE (OUTPATIENT)
Dept: INTERNAL MEDICINE | Facility: CLINIC | Age: 57
End: 2022-10-17

## 2022-10-17 ENCOUNTER — OFFICE VISIT (OUTPATIENT)
Dept: INTERNAL MEDICINE | Facility: CLINIC | Age: 57
End: 2022-10-17
Payer: COMMERCIAL

## 2022-10-17 ENCOUNTER — TELEPHONE (OUTPATIENT)
Dept: INTERNAL MEDICINE | Facility: CLINIC | Age: 57
End: 2022-10-17

## 2022-10-17 VITALS
SYSTOLIC BLOOD PRESSURE: 135 MMHG | TEMPERATURE: 99.4 F | OXYGEN SATURATION: 97 % | HEART RATE: 80 BPM | HEIGHT: 65 IN | WEIGHT: 241.2 LBS | DIASTOLIC BLOOD PRESSURE: 85 MMHG | BODY MASS INDEX: 40.19 KG/M2

## 2022-10-17 DIAGNOSIS — R07.81 PLEURITIC CHEST PAIN: ICD-10-CM

## 2022-10-17 DIAGNOSIS — R07.89 LEFT-SIDED CHEST WALL PAIN: ICD-10-CM

## 2022-10-17 DIAGNOSIS — I26.99 PULMONARY INFARCT (H): ICD-10-CM

## 2022-10-17 DIAGNOSIS — I10 PRIMARY HYPERTENSION: Chronic | ICD-10-CM

## 2022-10-17 DIAGNOSIS — I26.99 PULMONARY INFARCT (H): Primary | ICD-10-CM

## 2022-10-17 DIAGNOSIS — I26.99 BILATERAL PULMONARY EMBOLISM (H): Primary | ICD-10-CM

## 2022-10-17 LAB
ALBUMIN SERPL BCG-MCNC: 4.5 G/DL (ref 3.5–5.2)
ALP SERPL-CCNC: 96 U/L (ref 40–129)
ALT SERPL W P-5'-P-CCNC: 10 U/L (ref 10–50)
ANION GAP SERPL CALCULATED.3IONS-SCNC: 13 MMOL/L (ref 7–15)
AST SERPL W P-5'-P-CCNC: 22 U/L (ref 10–50)
BASOPHILS # BLD AUTO: 0 10E3/UL (ref 0–0.2)
BASOPHILS NFR BLD AUTO: 0 %
BILIRUB SERPL-MCNC: 0.3 MG/DL
BUN SERPL-MCNC: 18.4 MG/DL (ref 6–20)
CALCIUM SERPL-MCNC: 9.9 MG/DL (ref 8.6–10)
CHLORIDE SERPL-SCNC: 97 MMOL/L (ref 98–107)
CREAT SERPL-MCNC: 0.76 MG/DL (ref 0.67–1.17)
CRP SERPL-MCNC: 84.3 MG/L
DEPRECATED HCO3 PLAS-SCNC: 28 MMOL/L (ref 22–29)
EOSINOPHIL # BLD AUTO: 0.1 10E3/UL (ref 0–0.7)
EOSINOPHIL NFR BLD AUTO: 2 %
ERYTHROCYTE [DISTWIDTH] IN BLOOD BY AUTOMATED COUNT: 12.1 % (ref 10–15)
ERYTHROCYTE [SEDIMENTATION RATE] IN BLOOD BY WESTERGREN METHOD: 31 MM/HR (ref 0–15)
GFR SERPL CREATININE-BSD FRML MDRD: >90 ML/MIN/1.73M2
GLUCOSE SERPL-MCNC: 101 MG/DL (ref 70–99)
HCT VFR BLD AUTO: 45 % (ref 40–53)
HGB BLD-MCNC: 14.8 G/DL (ref 13.3–17.7)
IMM GRANULOCYTES # BLD: 0 10E3/UL
IMM GRANULOCYTES NFR BLD: 0 %
LYMPHOCYTES # BLD AUTO: 1.1 10E3/UL (ref 0.8–5.3)
LYMPHOCYTES NFR BLD AUTO: 20 %
MCH RBC QN AUTO: 33.3 PG (ref 26.5–33)
MCHC RBC AUTO-ENTMCNC: 32.9 G/DL (ref 31.5–36.5)
MCV RBC AUTO: 101 FL (ref 78–100)
MONOCYTES # BLD AUTO: 0.5 10E3/UL (ref 0–1.3)
MONOCYTES NFR BLD AUTO: 10 %
NEUTROPHILS # BLD AUTO: 3.6 10E3/UL (ref 1.6–8.3)
NEUTROPHILS NFR BLD AUTO: 68 %
PLATELET # BLD AUTO: 179 10E3/UL (ref 150–450)
POTASSIUM SERPL-SCNC: 4.6 MMOL/L (ref 3.4–5.3)
PROT SERPL-MCNC: 8.5 G/DL (ref 6.4–8.3)
RBC # BLD AUTO: 4.44 10E6/UL (ref 4.4–5.9)
SODIUM SERPL-SCNC: 138 MMOL/L (ref 136–145)
WBC # BLD AUTO: 5.4 10E3/UL (ref 4–11)

## 2022-10-17 PROCEDURE — 255N000002 HC RX 255 OP 636: Performed by: INTERNAL MEDICINE

## 2022-10-17 PROCEDURE — 99215 OFFICE O/P EST HI 40 MIN: CPT | Performed by: INTERNAL MEDICINE

## 2022-10-17 PROCEDURE — 71275 CT ANGIOGRAPHY CHEST: CPT

## 2022-10-17 PROCEDURE — 80053 COMPREHEN METABOLIC PANEL: CPT | Performed by: INTERNAL MEDICINE

## 2022-10-17 PROCEDURE — 85652 RBC SED RATE AUTOMATED: CPT | Performed by: INTERNAL MEDICINE

## 2022-10-17 PROCEDURE — 99000 SPECIMEN HANDLING OFFICE-LAB: CPT | Performed by: INTERNAL MEDICINE

## 2022-10-17 PROCEDURE — 86698 HISTOPLASMA ANTIBODY: CPT | Mod: 90 | Performed by: INTERNAL MEDICINE

## 2022-10-17 PROCEDURE — 74177 CT ABD & PELVIS W/CONTRAST: CPT

## 2022-10-17 PROCEDURE — 36415 COLL VENOUS BLD VENIPUNCTURE: CPT | Performed by: INTERNAL MEDICINE

## 2022-10-17 PROCEDURE — 85025 COMPLETE CBC W/AUTO DIFF WBC: CPT | Performed by: INTERNAL MEDICINE

## 2022-10-17 PROCEDURE — 86140 C-REACTIVE PROTEIN: CPT | Performed by: INTERNAL MEDICINE

## 2022-10-17 PROCEDURE — 86256 FLUORESCENT ANTIBODY TITER: CPT | Performed by: INTERNAL MEDICINE

## 2022-10-17 PROCEDURE — 86036 ANCA SCREEN EACH ANTIBODY: CPT | Performed by: INTERNAL MEDICINE

## 2022-10-17 RX ADMIN — IOHEXOL 100 ML: 350 INJECTION, SOLUTION INTRAVENOUS at 18:40

## 2022-10-17 ASSESSMENT — PAIN SCALES - GENERAL: PAINLEVEL: EXTREME PAIN (8)

## 2022-10-17 NOTE — TELEPHONE ENCOUNTER
Called patient. Patient took offer to see PCP today at 3 PM. Writer advised patient that if symptoms worsen and he is needing to be seen sooner, he should head to the nearest ED. Patient is agreeable with this plan and thanked for call.

## 2022-10-17 NOTE — PROGRESS NOTES
[facility]    Comprehensive and complex medical care - Chronic disease management - Shared decision making - Care coordination - Compassionate care    Patient advocacy - Rational deprescribing - Minimally disruptive medicine - Ethical focus - Customized care         Date of Service: 10/17/2022  Primary Provider: Noe Beckwith    Patient Care Team:  Noe Beckwith MD as PCP - General (Internal Medicine)  Kiran Laird MD as Referring Physician (General Surgery)  Noe Beckwith MD as Assigned PCP  Kiran Laird MD as Surgeon (Surgery)  Rajiv Davis (Ophthalmology)  Ho Ya MD as MD (Orthopaedic Surgery)          Patient's Pharmacy:    [facility] [address]  Phone: [phone] Fax: [phone]     Patient's Contacts:  Name Home Phone Work Phone Mobile Phone Relationship Lgl Grd   LEIGH CAMERON [phone] NONE [phone] Spouse    RAKESHDOBLANK NONE NONE NONE Father      Patient's Insurance:    Payor: BCBS / Plan: BCBS OUT OF STATE / Product Type: Indemnity /            Active Problem List:  Problem List as of 10/17/2022 Reviewed: 9/23/2022 10:50 AM by Noe Beckwith MD       High    Former smoker - quit in 1986       Medium    HTN (hypertension)    Status post laparoscopic sleeve gastrectomy       Low    Venous insufficiency    Morbid obesity (H)    Heart murmur    NSAID long-term use    ANASTACIO on CPAP       Other    Gout        Current Outpatient Medications   Medication Instructions     allopurinol (ZYLOPRIM) 300 mg, Oral, DAILY     Calcium Citrate-Vitamin D (CALCIUM CITRATE + PO) 1,200-1,500 tablets, Oral     Celecoxib (CELEBREX PO) Oral     Cyanocobalamin (B-12) 1000 MCG SUBL Sublingual     multivitamin w/minerals (THERA-VIT-M) tablet 1 tablet, Oral, DAILY     torsemide (DEMADEX) 20 MG tablet TAKE ONE TABLET BY MOUTH EVERY DAY     "  Social History     Social History Narrative    Patient of Dr. Beckwith since 2013.     .       Subjective:     Alvin Fitzpatrick is a 56 year old male who comes in today for:    Chief Complaint   Patient presents with     Chest Pain     Left side. Severe pain when taking full breaths.      Patient comes in today to clinic with left-sided chest wall pain.    He has noted a little bit of streaking of blood in his sputum as well.    This occurred Saturday night.  He has had difficulty getting a deep breath due to pleuritic chest pain.  He denies any fevers or chills or other signs of systemic illness.    His previous work-up was reviewed.    He does feel like his breathing is restricted.  He denies any central chest pain or tightness.    We reviewed blood work that needs to be done as well in relationship to this today.    We reviewed his other issues noted in the assessment but not specifically addressed in the HPI above.     Objective:     Wt Readings from Last 3 Encounters:   10/17/22 109.4 kg (241 lb 3.2 oz)   09/23/22 108 kg (238 lb)   09/13/22 112.1 kg (247 lb 3.2 oz)     BP Readings from Last 3 Encounters:   10/17/22 135/85   09/23/22 (!) 141/82   09/13/22 139/88     /85 (BP Location: Right arm, Patient Position: Left side, Cuff Size: Adult Regular)   Pulse 80   Temp 99.4  F (37.4  C) (Oral)   Ht 1.651 m (5' 5\")   Wt 109.4 kg (241 lb 3.2 oz)   SpO2 97%   BMI 40.14 kg/m     The patient is comfortable, no acute distress.  Mood good.  Insight good.  Eyes are nonicteric.  Neck is supple without mass.  No cervical adenopathy.  No thyromegaly. Heart regular rate and rhythm.  Lungs clear to auscultation bilaterally.  Respiratory effort is good.  Abdomen soft and nontender.  No hepatosplenomegaly.  Extremities show stable chronic venous insufficiency changes.      Diagnostics:     Office Visit - [facility] on 02/15/2021   Component Date Value Ref Range Status     WBC 02/15/2021 4.2  4.0 - 11.0 thou/uL " Final     RBC Count 02/15/2021 4.37 (L)  4.40 - 6.20 mill/uL Final     Hemoglobin 02/15/2021 14.9  14.0 - 18.0 g/dL Final     Hematocrit 02/15/2021 43.5  40.0 - 54.0 % Final     MCV 02/15/2021 100  80 - 100 fL Final     MCH 02/15/2021 34.1 (H)  27.0 - 34.0 pg Final     MCHC 02/15/2021 34.3  32.0 - 36.0 g/dL Final     RDW 02/15/2021 12.2  11.0 - 14.5 % Final     Platelet Count 02/15/2021 214  140 - 440 thou/uL Final     Mean Platelet Volume 02/15/2021 10.3 (H)  7.0 - 10.0 fL Final     Ferritin 02/15/2021 391 (H)  27 - 300 ng/mL Final     Sodium 02/15/2021 142  136 - 145 mmol/L Final     Potassium 02/15/2021 4.3  3.5 - 5.0 mmol/L Final     Chloride 02/15/2021 99  98 - 107 mmol/L Final     Carbon Dioxide (CO2) 02/15/2021 31  22 - 31 mmol/L Final     Anion Gap 02/15/2021 12  5 - 18 mmol/L Final     Glucose 02/15/2021 75  70 - 125 mg/dL Final     Calcium 02/15/2021 9.6  8.5 - 10.5 mg/dL Final     Urea Nitrogen 02/15/2021 21  8 - 22 mg/dL Final     Creatinine 02/15/2021 0.73  0.70 - 1.30 mg/dL Final     GFR Estimate If Black 02/15/2021 >60  >60 mL/min/1.73m2 Final     GFR Estimate 02/15/2021 >60  >60 mL/min/1.73m2 Final     Uric Acid 02/15/2021 6.9  3.0 - 8.0 mg/dL Final     Prostate Specific Antigen Screen 02/15/2021 0.6  0.0 - 3.5 ng/mL Final       Results for orders placed or performed during the hospital encounter of 10/17/22   CT Chest Pulmonary Embolism w Contrast     Status: None    Narrative    EXAM: CT ABDOMEN PELVIS W CONTRAST, CT CHEST PULMONARY EMBOLISM W CONTRAST  LOCATION: [facility]  DATE/TIME: 10/17/2022 6:42 PM    INDICATION: Pulmonary infarct.  US of the lower extremities nl.  Evaulate for malignancy and evaluate for pelvic vein thrombosis or IVC thrombosis.  COMPARISON: None.  TECHNIQUE: CT scan of the chest, abdomen and pelvis was performed following injection of IV contrast. Multiplanar reformats were obtained. Dose reduction techniques were used.  CONTRAST: 100 mL Omnipaque  350    FINDINGS:   CHEST: Acute-appearing pulmonary emboli in the distal left pulmonary artery with extension into lobar, segmental, and subsegmental arterial branches in the left lower and left upper lobes and scattered acute-appearing emboli in segmental and subsegmental   branches in the right upper and right lower lobes. No saddle emboli. Normal caliber aorta with no aneurysm or dissection. No evidence of right heart strain. RV: LV ratio = 0.8. Wedge-shaped area of consolidation and groundglass attenuation in the left   upper lobe inferior lingular segment. Strand of atelectasis or scarring in the right upper lobe. Moderate calcified atherosclerosis left anterior descending coronary artery.    HEPATOBILIARY: Normal.    PANCREAS: Normal.    SPLEEN: Normal.    ADRENAL GLANDS: Normal.    KIDNEYS/BLADDER: Normal.    BOWEL: Sleeve gastrectomy.    LYMPH NODES: Normal.    VASCULATURE: Trace atherosclerosis.    PELVIC ORGANS: Normal.    MUSCULOSKELETAL: Small fat-containing paraumbilical hernia with a small amount of edema within it. Right total hip arthroplasty. Moderate degenerative change in the spine.      Impression    IMPRESSION:   1.  Acute-appearing bilateral pulmonary emboli with a left upper lobe pulmonary infarction, but no evidence of right heart strain  2.  Small amount of edema within the fat-containing paraumbilical hernia is the possibility of a strangulated hernia    Discussed by telephone with Dr. Beckwith at 7:02 P.M. on 10/17/2022.   Results for orders placed or performed during the hospital encounter of 10/17/22   CT Abdomen Pelvis w Contrast     Status: None    Narrative    EXAM: CT ABDOMEN PELVIS W CONTRAST, CT CHEST PULMONARY EMBOLISM W CONTRAST  LOCATION: [facility]  DATE/TIME: 10/17/2022 6:42 PM    INDICATION: Pulmonary infarct.  US of the lower extremities nl.  Evaulate for malignancy and evaluate for pelvic vein thrombosis or IVC thrombosis.  COMPARISON:  None.  TECHNIQUE: CT scan of the chest, abdomen and pelvis was performed following injection of IV contrast. Multiplanar reformats were obtained. Dose reduction techniques were used.  CONTRAST: 100 mL Omnipaque 350    FINDINGS:   CHEST: Acute-appearing pulmonary emboli in the distal left pulmonary artery with extension into lobar, segmental, and subsegmental arterial branches in the left lower and left upper lobes and scattered acute-appearing emboli in segmental and subsegmental   branches in the right upper and right lower lobes. No saddle emboli. Normal caliber aorta with no aneurysm or dissection. No evidence of right heart strain. RV: LV ratio = 0.8. Wedge-shaped area of consolidation and groundglass attenuation in the left   upper lobe inferior lingular segment. Strand of atelectasis or scarring in the right upper lobe. Moderate calcified atherosclerosis left anterior descending coronary artery.    HEPATOBILIARY: Normal.    PANCREAS: Normal.    SPLEEN: Normal.    ADRENAL GLANDS: Normal.    KIDNEYS/BLADDER: Normal.    BOWEL: Sleeve gastrectomy.    LYMPH NODES: Normal.    VASCULATURE: Trace atherosclerosis.    PELVIC ORGANS: Normal.    MUSCULOSKELETAL: Small fat-containing paraumbilical hernia with a small amount of edema within it. Right total hip arthroplasty. Moderate degenerative change in the spine.      Impression    IMPRESSION:   1.  Acute-appearing bilateral pulmonary emboli with a left upper lobe pulmonary infarction, but no evidence of right heart strain  2.  Small amount of edema within the fat-containing paraumbilical hernia is the possibility of a strangulated hernia    Discussed by telephone with Dr. Beckwith at 7:02 P.M. on 10/17/2022.   Results for orders placed or performed in visit on 10/17/22   Erythrocyte sedimentation rate auto     Status: Abnormal   Result Value Ref Range    Erythrocyte Sedimentation Rate 31 (H) 0 - 15 mm/hr   CRP inflammation     Status: Abnormal   Result Value Ref Range     CRP Inflammation 84.30 (H) <5.00 mg/L   Comprehensive metabolic panel     Status: Abnormal   Result Value Ref Range    Sodium 138 136 - 145 mmol/L    Potassium 4.6 3.4 - 5.3 mmol/L    Chloride 97 (L) 98 - 107 mmol/L    Carbon Dioxide (CO2) 28 22 - 29 mmol/L    Anion Gap 13 7 - 15 mmol/L    Urea Nitrogen 18.4 6.0 - 20.0 mg/dL    Creatinine 0.76 0.67 - 1.17 mg/dL    Calcium 9.9 8.6 - 10.0 mg/dL    Glucose 101 (H) 70 - 99 mg/dL    Alkaline Phosphatase 96 40 - 129 U/L    AST 22 10 - 50 U/L    ALT 10 10 - 50 U/L    Protein Total 8.5 (H) 6.4 - 8.3 g/dL    Albumin 4.5 3.5 - 5.2 g/dL    Bilirubin Total 0.3 <=1.2 mg/dL    GFR Estimate >90 >60 mL/min/1.73m2   CBC with platelets and differential     Status: Abnormal   Result Value Ref Range    WBC Count 5.4 4.0 - 11.0 10e3/uL    RBC Count 4.44 4.40 - 5.90 10e6/uL    Hemoglobin 14.8 13.3 - 17.7 g/dL    Hematocrit 45.0 40.0 - 53.0 %     (H) 78 - 100 fL    MCH 33.3 (H) 26.5 - 33.0 pg    MCHC 32.9 31.5 - 36.5 g/dL    RDW 12.1 10.0 - 15.0 %    Platelet Count 179 150 - 450 10e3/uL    % Neutrophils 68 %    % Lymphocytes 20 %    % Monocytes 10 %    % Eosinophils 2 %    % Basophils 0 %    % Immature Granulocytes 0 %    Absolute Neutrophils 3.6 1.6 - 8.3 10e3/uL    Absolute Lymphocytes 1.1 0.8 - 5.3 10e3/uL    Absolute Monocytes 0.5 0.0 - 1.3 10e3/uL    Absolute Eosinophils 0.1 0.0 - 0.7 10e3/uL    Absolute Basophils 0.0 0.0 - 0.2 10e3/uL    Absolute Immature Granulocytes 0.0 <=0.4 10e3/uL   CBC with Platelets & Differential     Status: Abnormal    Narrative    The following orders were created for panel order CBC with Platelets & Differential.  Procedure                               Abnormality         Status                     ---------                               -----------         ------                     CBC with platelets and d...[[id number]]  Abnormal            Final result                 Please view results for these tests on the individual orders.       Assessment:     1. Bilateral pulmonary embolism (H)    2. Pulmonary infarct (H)    3. Left-sided chest wall pain    4. Pleuritic chest pain    5. Primary hypertension        Plan:     1. We did blood work as noted below and CT scans done today.  2. The CT pulmonary angiogram was positive for pulmonary emboli with left pulmonary infarct.  3. Started Xarelto today.  4. I called the patient personally regarding the results.  5. We will try to work out a follow-up in 1 week.  6. No obvious inciting event for this and likely a recurrent issue.  May benefit from lifelong anticoagulation.  Risk factors are age and weight.  7. Consider hypercoagulable work-up if would be altering the course of care.  8. Continue current medications otherwise.  9. Follow up sooner if issues.    Orders Placed This Encounter   Procedures     CT Abdomen Pelvis w Contrast     CT Chest Pulmonary Embolism w Contrast     Erythrocyte sedimentation rate auto     CRP inflammation     Comprehensive metabolic panel     ANCA IgG by IFA with Reflex to Titer     Histoplasma capsulatum antigen     CBC with platelets and differential     Histoplasma Antigen Quantitative by EIA     iStat Creatinine, POCT     CBC with Platelets & Differential        40 minutes or greater was spent today on the patient's care on the day of service.      This includes time for chart preparation, reviewing medical tests done before or during the visit, talking with the patient, review of quality indicators, required documentation, and other elements of care.        Noe Beckwith MD  General Internal Medicine  [facility]    Return in about 1 week (around 10/24/2022), or if symptoms worsen or fail to improve, for follow up visit.     Future Appointments   Date Time Provider Department Center   11/14/2022  8:00 AM [facility]

## 2022-10-17 NOTE — TELEPHONE ENCOUNTER
I can see at 3 pm today if he prefers.  We might end up doing blood work and a CT scan again to see what is up.    Noe Beckwith MD  General Internal Medicine  [facility]  10/17/2022, 8:33 AM

## 2022-10-17 NOTE — TELEPHONE ENCOUNTER
"Nurse Triage SBAR    Is this a 2nd Level Triage? YES, LICENSED PRACTITIONER REVIEW IS REQUIRED    Situation:   Pt suspects possible L-sided PE onsetting.  Pt reports eval for \"minor PE\" one month ago.  Per OV chart notes of 9/23/22:  \"Minor abnormalities of R chest found w/CT scan.\"  Now \"similar symptoms on L side of chest.\"    Background:   Pt sent [employer] message yesterday (10/16/22):  Good evening, Dr. Beckwith.     I believe that I might have experienced another episode of whatever was going on on my right side lung last month on my left side. Only this time it seems to be somewhat different, and worse.      I had been hanging some sheetrock in my basement (alone) and on Friday night, started to feel as if I pulled some muscles (again) in my back because I had to twist in a strange way to get the sheet rock situated.     Yesterday it progressed and I managed it with some Tylenol, but last night, it was worse. It still strangely feels like a strained rib or pulled muscle, but something is telling me that I need to report it and see you if you can see me sometime tomorrow/Monday 10/17. I can come in anytime.    Assessment:   \"Over the past 24 hours, unable to take a full deep breath.\"  Current pain in L side of chest and upper back.  Pain prevented sleep overnight.  Pt resists advice to seek ED eval promptly.  Prefers provider advice.    Protocol Recommended Disposition:   Call  Now  Routed to provider for advice per pt's request.    Does the patient meet one of the following criteria for ADS visit consideration? 16+ years old, with an [facility] PCP -> No (chest pain not among symptoms accepted at ADS).    TIP  Providers, please consider if this condition is appropriate for management at one of our Acute and Diagnostic Services sites.     If patient is a good candidate, please use dotphrase <dot>triageresponse and select Refer to ADS to document.    Callback # for pt -> [phone]     Thank you-    Lazara " [facility] Nurse Advisor     Reason for Disposition    Chest pain lasting longer than 5 minutes and ANY of the following:* Over 44 years old* Over 30 years old and at least one cardiac risk factor (e.g., diabetes mellitus, high blood pressure, high cholesterol, smoker, or strong family history of heart disease)* History of heart disease (i.e., angina, heart attack, heart failure, bypass surgery, takes nitroglycerin)* Pain is crushing, pressure-like, or heavy    Additional Information    Negative: SEVERE difficulty breathing (e.g., struggling for each breath, speaks in single words)    Negative: Passed out (i.e., fainted, collapsed and was not responding)    Negative: Difficult to awaken or acting confused (e.g., disoriented, slurred speech)    Negative: Shock suspected (e.g., cold/pale/clammy skin, too weak to stand, low BP, rapid pulse)    Protocols used: CHEST PAIN-A-OH

## 2022-10-18 LAB
ANCA AB PATTERN SER IF-IMP: NORMAL
C-ANCA TITR SER IF: NORMAL {TITER}

## 2022-10-18 RX ORDER — HYDROCODONE BITARTRATE AND ACETAMINOPHEN 5; 325 MG/1; MG/1
1 TABLET ORAL EVERY 6 HOURS PRN
Qty: 15 TABLET | Refills: 0 | Status: SHIPPED | OUTPATIENT
Start: 2022-10-18 | End: 2022-11-14

## 2022-10-21 LAB
H CAPSUL AG SER IA-ACNC: NOT DETECTED
H CAPSUL AG SER QL IA: NOT DETECTED

## 2022-10-24 ENCOUNTER — OFFICE VISIT (OUTPATIENT)
Dept: INTERNAL MEDICINE | Facility: CLINIC | Age: 57
End: 2022-10-24
Payer: COMMERCIAL

## 2022-10-24 VITALS
HEIGHT: 65 IN | TEMPERATURE: 98.5 F | HEART RATE: 67 BPM | BODY MASS INDEX: 39.99 KG/M2 | OXYGEN SATURATION: 98 % | DIASTOLIC BLOOD PRESSURE: 72 MMHG | WEIGHT: 240 LBS | SYSTOLIC BLOOD PRESSURE: 132 MMHG

## 2022-10-24 DIAGNOSIS — I26.99 PE (PULMONARY THROMBOEMBOLISM) (H): ICD-10-CM

## 2022-10-24 PROCEDURE — 99215 OFFICE O/P EST HI 40 MIN: CPT | Performed by: INTERNAL MEDICINE

## 2022-10-24 NOTE — PROGRESS NOTES
[facility]    Comprehensive and complex medical care - Chronic disease management - Shared decision making - Care coordination - Compassionate care    Patient advocacy - Rational deprescribing - Minimally disruptive medicine - Ethical focus - Customized care         Date of Service: 10/24/2022  Primary Provider: Noe Beckwith    Patient Care Team:  Noe Beckwith MD as PCP - General (Internal Medicine)  Kiran Laird MD as Referring Physician (General Surgery)  Noe Beckwith MD as Assigned PCP  Kiran Laird MD as Surgeon (Surgery)  Rajiv Davis (Ophthalmology)  Ho Ya MD as MD (Orthopaedic Surgery)          Patient's Pharmacy:    [facility], [address] [address]  Phone: [phone] Fax: [phone]     Patient's Contacts:  Name Home Phone Work Phone Mobile Phone Relationship Lgl Grd   LEIGH CAMERON [phone] NONE [phone] Spouse    RAKESH BLANK NONE NONE NONE Father      Patient's Insurance:    Payor: BCBS / Plan: BCBS OUT OF STATE / Product Type: Indemnity /            Active Problem List:  Problem List as of 10/24/2022 Reviewed: 10/18/2022  9:09 AM by Noe Beckwith MD       High    Former smoker - quit in 1986    PE (pulmonary thromboembolism) (H)       Medium    HTN (hypertension)    Gout    Status post laparoscopic sleeve gastrectomy       Low    Venous insufficiency    Morbid obesity (H)    Heart murmur    NSAID long-term use    ANASTACIO on CPAP        Current Outpatient Medications   Medication Instructions     allopurinol (ZYLOPRIM) 300 mg, Oral, DAILY     Calcium Citrate-Vitamin D (CALCIUM CITRATE + PO) 1,200-1,500 tablets, Oral     Celecoxib (CELEBREX PO) Oral     Cyanocobalamin (B-12) 1000 MCG SUBL Sublingual     HYDROcodone-acetaminophen (NORCO) 5-325 MG tablet 1 tablet, Oral, EVERY 6 HOURS PRN     multivitamin w/minerals  "(THERA-VIT-M) tablet 1 tablet, Oral, DAILY     torsemide (DEMADEX) 20 MG tablet TAKE ONE TABLET BY MOUTH EVERY DAY      Social History     Social History Narrative    Patient of Dr. Beckwith since 2013.     .       Subjective:     Alvin Fitzpatrick is a 56 year old male who comes in today for:    Chief Complaint   Patient presents with     Follow Up     Pulmonary infarct       Comes in today to follow up his pulmonary embolism (PE).    He was diagnosed 1 week ago.  He is on rivaroxaban (Xarelto) for this and tolerating the medication.  He did have a cut today and did bleed rather easily.  No other signs or symptoms of bleeding or side effects.    Discussed anticoagulation and management of risk with clotting and bleeding.    No obvious precipitating factor in this process.    Symptoms are improved especially left sided pleuritic pain and chest wall pain.  Breathing is good.  Used about 2 pills of the hydrocodone.    Went over the scans themselves with the patient.    Discussed genetic testing for coagulation disorders.    No new symptoms.    We reviewed his other issues noted in the assessment but not specifically addressed in the HPI above.     Objective:     Wt Readings from Last 3 Encounters:   10/24/22 108.9 kg (240 lb)   10/17/22 109.4 kg (241 lb 3.2 oz)   09/23/22 108 kg (238 lb)     BP Readings from Last 3 Encounters:   10/24/22 132/72   10/17/22 135/85   09/23/22 (!) 141/82     /72 (BP Location: Right arm, Patient Position: Sitting, Cuff Size: Adult Large)   Pulse 67   Temp 98.5  F (36.9  C) (Oral)   Ht 1.651 m (5' 5\")   Wt 108.9 kg (240 lb)   SpO2 98%   BMI 39.94 kg/m     The patient is comfortable, no acute distress.  Mood good.  Insight good.  Eyes are nonicteric.  Neck is supple without mass.  No cervical adenopathy.  No thyromegaly. Heart regular rate and rhythm.  Lungs clear to auscultation bilaterally.   Extremities 1+  edema.      Diagnostics:     Office Visit on 10/17/2022 "   Component Date Value Ref Range Status     Erythrocyte Sedimentation Rate 10/17/2022 31 (H)  0 - 15 mm/hr Final     CRP Inflammation 10/17/2022 84.30 (H)  <5.00 mg/L Final     Sodium 10/17/2022 138  136 - 145 mmol/L Final     Potassium 10/17/2022 4.6  3.4 - 5.3 mmol/L Final     Chloride 10/17/2022 97 (L)  98 - 107 mmol/L Final     Carbon Dioxide (CO2) 10/17/2022 28  22 - 29 mmol/L Final     Anion Gap 10/17/2022 13  7 - 15 mmol/L Final     Urea Nitrogen 10/17/2022 18.4  6.0 - 20.0 mg/dL Final     Creatinine 10/17/2022 0.76  0.67 - 1.17 mg/dL Final     Calcium 10/17/2022 9.9  8.6 - 10.0 mg/dL Final     Glucose 10/17/2022 101 (H)  70 - 99 mg/dL Final     Alkaline Phosphatase 10/17/2022 96  40 - 129 U/L Final     AST 10/17/2022 22  10 - 50 U/L Final     ALT 10/17/2022 10  10 - 50 U/L Final     Protein Total 10/17/2022 8.5 (H)  6.4 - 8.3 g/dL Final     Albumin 10/17/2022 4.5  3.5 - 5.2 g/dL Final     Bilirubin Total 10/17/2022 0.3  <=1.2 mg/dL Final     GFR Estimate 10/17/2022 >90  >60 mL/min/1.73m2 Final    Effective December 21, 2021 eGFRcr in adults is calculated using the 2021 CKD-EPI creatinine equation which includes age and gender (Nicholas et al., NEJM, DOI: 10.1056/RYGZwx6474633)     Neutrophil Cytoplasmic Antibody 10/17/2022 <1:10  <1:10 Final     Neutrophil Cytoplasmic Antibody Pa* 10/17/2022 The ANCA IFA is <1:10.  No further testing will be performed.   Final     WBC Count 10/17/2022 5.4  4.0 - 11.0 10e3/uL Final     RBC Count 10/17/2022 4.44  4.40 - 5.90 10e6/uL Final     Hemoglobin 10/17/2022 14.8  13.3 - 17.7 g/dL Final     Hematocrit 10/17/2022 45.0  40.0 - 53.0 % Final     MCV 10/17/2022 101 (H)  78 - 100 fL Final     MCH 10/17/2022 33.3 (H)  26.5 - 33.0 pg Final     MCHC 10/17/2022 32.9  31.5 - 36.5 g/dL Final     RDW 10/17/2022 12.1  10.0 - 15.0 % Final     Platelet Count 10/17/2022 179  150 - 450 10e3/uL Final     % Neutrophils 10/17/2022 68  % Final     % Lymphocytes 10/17/2022 20  % Final     %  Monocytes 10/17/2022 10  % Final     % Eosinophils 10/17/2022 2  % Final     % Basophils 10/17/2022 0  % Final     % Immature Granulocytes 10/17/2022 0  % Final     Absolute Neutrophils 10/17/2022 3.6  1.6 - 8.3 10e3/uL Final     Absolute Lymphocytes 10/17/2022 1.1  0.8 - 5.3 10e3/uL Final     Absolute Monocytes 10/17/2022 0.5  0.0 - 1.3 10e3/uL Final     Absolute Eosinophils 10/17/2022 0.1  0.0 - 0.7 10e3/uL Final     Absolute Basophils 10/17/2022 0.0  0.0 - 0.2 10e3/uL Final     Absolute Immature Granulocytes 10/17/2022 0.0  <=0.4 10e3/uL Final     Histoplasma Antigen, Serum 10/17/2022 Not Detected  Not Detected Final    Performed By: [facility]  [address] [place] [id number]  : Melvin Díaz MD, PhD     Histoplasma Antigen, Serum Interp 10/17/2022 Not Detected  Not Detected Final    Comment: INTERPRETIVE INFORMATION: Histoplasma Antigen Quantitative   by EIA, Serum    Less than 0.19 ng/mL = Not Detected  0.19-60.0 ng/mL = Detected  Greater than 60.0 ng/mL = Detected (above the limit of   quantification).    The quantitative range of this assay is 0.19-60.0 ng/mL.   Antigen concentrations greater than 60.0 ng/mL fall outside   the linear range of the assay and cannot be accurately   quantified.    This EIA test should be used in conjunction with other   diagnostic procedures, including microbiological culture,   histological examination of biopsy samples, and/or   radiographic evidence, to aid in the diagnosis of   histoplasmosis.    Crossreactivity with Blastomyces dermatiditis, Coccidioides   immitis and possibly Talaromyces marneffei have been   observed with this EIA.  Other clinically and   geographically relevant endemic mycoses should be   considered in the case of a positive test result.    This test was developed and its performance characteristics   determined by                            [facility]. It has not been cleared or   approved by the US Food  and Drug Administration. This test   was performed in a CLIA certified laboratory and is   intended for clinical purposes.       No results found for any visits on 10/24/22.     Assessment:     1. PE (pulmonary thromboembolism) (H)        Plan:     1. Most of time spent in counseling.  2. Physical in 3 weeks.    3. Continue rivaroxaban (Xarelto).  4. Discussed hematology referral but okay with me managing at this time.  5. Consider vaccination at the physical.  6. RUN FACTOR V LEIDEN, Prothrombin MUTATION and APLs at the next visit.  7. Continue current medications otherwise.  8. Follow up sooner if issues.    No orders of the defined types were placed in this encounter.       40 minutes or greater was spent today on the patient's care on the day of service.      This includes time for chart preparation, reviewing medical tests done before or during the visit, talking with the patient, review of quality indicators, required documentation, and other elements of care.        Noe Beckwith MD  General Internal Medicine  [facility]    Return in about 3 weeks (around 11/14/2022), or if symptoms worsen or fail to improve, for physical.     Future Appointments   Date Time Provider Department Center   11/14/2022  8:00 AM Noe Beckwith MD [facility]

## 2022-10-24 NOTE — PROGRESS NOTES
Wt Readings from Last 20 Encounters:   10/24/22 108.9 kg (240 lb)   10/17/22 109.4 kg (241 lb 3.2 oz)   09/23/22 108 kg (238 lb)   09/13/22 112.1 kg (247 lb 3.2 oz)   02/15/21 107.5 kg (236 lb 14.4 oz)   12/22/20 106.5 kg (234 lb 14.4 oz)   02/10/20 110.1 kg (242 lb 12.8 oz)   05/02/19 108.1 kg (238 lb 4.8 oz)   04/22/19 108.9 kg (240 lb)   01/11/19 109.4 kg (241 lb 3.2 oz)   11/06/17 103.4 kg (227 lb 14.4 oz)   09/06/17 98.9 kg (218 lb)   10/07/16 96.2 kg (212 lb 1.6 oz)   09/29/16 95.7 kg (211 lb)   01/27/16 109.5 kg (241 lb 6.4 oz)   12/14/15 116.1 kg (256 lb)   12/03/15 116.1 kg (256 lb)   10/02/15 123.7 kg (272 lb 9.6 oz)   10/02/15 123.9 kg (273 lb 3.2 oz)   08/11/15 133.4 kg (294 lb)     BP Readings from Last 20 Encounters:   10/24/22 132/72   10/17/22 135/85   09/23/22 (!) 141/82   09/13/22 139/88   02/15/21 132/60   12/22/20 132/70   02/10/20 136/84   09/06/17 125/69   10/07/16 115/69   01/27/16 106/66   10/02/15 110/68   07/30/15 112/64   07/09/15 117/64   07/01/15 145/78   06/17/15 119/75   06/12/15 140/65   06/12/15 127/70   06/02/15 130/79   05/05/15 114/79   03/25/15 134/90      Pulse Readings from Last 20 Encounters:   10/24/22 67   10/17/22 80   09/23/22 62   09/13/22 67   02/15/21 84   12/22/20 85   02/10/20 83   09/06/17 58   10/07/16 56   01/27/16 62   10/02/15 66   07/30/15 82   07/09/15 76   07/01/15 96   06/17/15 87   06/12/15 77   06/12/15 87   06/02/15 74   05/05/15 87   03/25/15 101     SpO2 Readings from Last 20 Encounters:   10/24/22 98%   10/17/22 97%   09/23/22 100%   09/13/22 98%   02/15/21 95%   12/22/20 95%   02/10/20 100%   09/06/17 100%   10/07/16 98%   01/27/16 98%   10/02/15 100%   07/30/15 99%   07/09/15 99%   07/01/15 92%   06/17/15 98%   06/12/15 100%   06/02/15 96%   05/05/15 97%   03/25/15 92%   03/20/15 96%

## 2022-10-25 NOTE — PATIENT INSTRUCTIONS
Future Appointments   Date Time Provider Department Center   11/14/2022  8:00 AM [facility]

## 2022-11-08 ASSESSMENT — ENCOUNTER SYMPTOMS
ARTHRALGIAS: 1
ABDOMINAL PAIN: 0
JOINT SWELLING: 0
HEADACHES: 0
DIZZINESS: 0
FEVER: 0
CHILLS: 0
CONSTIPATION: 0
PALPITATIONS: 0
NAUSEA: 0
MYALGIAS: 0
COUGH: 0
SORE THROAT: 0
DYSURIA: 0
HEMATOCHEZIA: 0
HEARTBURN: 0
PARESTHESIAS: 0
WEAKNESS: 0
SHORTNESS OF BREATH: 0
NERVOUS/ANXIOUS: 0
DIARRHEA: 0
HEMATURIA: 0
FREQUENCY: 0
EYE PAIN: 0

## 2022-11-14 ENCOUNTER — OFFICE VISIT (OUTPATIENT)
Dept: INTERNAL MEDICINE | Facility: CLINIC | Age: 57
End: 2022-11-14
Payer: COMMERCIAL

## 2022-11-14 VITALS
SYSTOLIC BLOOD PRESSURE: 122 MMHG | HEART RATE: 68 BPM | DIASTOLIC BLOOD PRESSURE: 68 MMHG | TEMPERATURE: 98.7 F | HEIGHT: 65 IN | BODY MASS INDEX: 39.65 KG/M2 | WEIGHT: 238 LBS

## 2022-11-14 DIAGNOSIS — Z12.5 SCREENING FOR PROSTATE CANCER: ICD-10-CM

## 2022-11-14 DIAGNOSIS — I26.99 PE (PULMONARY THROMBOEMBOLISM) (H): ICD-10-CM

## 2022-11-14 DIAGNOSIS — Z00.00 ROUTINE PHYSICAL EXAMINATION: Primary | ICD-10-CM

## 2022-11-14 DIAGNOSIS — M1A.09X0 CHRONIC GOUT OF MULTIPLE SITES, UNSPECIFIED CAUSE: ICD-10-CM

## 2022-11-14 DIAGNOSIS — I10 PRIMARY HYPERTENSION: ICD-10-CM

## 2022-11-14 DIAGNOSIS — Z12.11 SCREEN FOR COLON CANCER: ICD-10-CM

## 2022-11-14 PROBLEM — Z79.1 NSAID LONG-TERM USE: Status: RESOLVED | Noted: 2019-01-12 | Resolved: 2022-11-14

## 2022-11-14 LAB
ANION GAP SERPL CALCULATED.3IONS-SCNC: 11 MMOL/L (ref 7–15)
BUN SERPL-MCNC: 15 MG/DL (ref 6–20)
CALCIUM SERPL-MCNC: 9.3 MG/DL (ref 8.6–10)
CHLORIDE SERPL-SCNC: 97 MMOL/L (ref 98–107)
CREAT SERPL-MCNC: 0.76 MG/DL (ref 0.67–1.17)
DEPRECATED HCO3 PLAS-SCNC: 27 MMOL/L (ref 22–29)
FACTOR 2 INTERPRETATION: NORMAL
FACTOR V INTERPRETATION: NORMAL
GFR SERPL CREATININE-BSD FRML MDRD: >90 ML/MIN/1.73M2
GLUCOSE SERPL-MCNC: 96 MG/DL (ref 70–99)
LAB DIRECTOR COMMENTS: NORMAL
LAB DIRECTOR DISCLAIMER: NORMAL
LAB DIRECTOR INTERPRETATION: NORMAL
LAB DIRECTOR METHODOLOGY: NORMAL
LAB DIRECTOR RESULTS: NORMAL
POTASSIUM SERPL-SCNC: 4.7 MMOL/L (ref 3.4–5.3)
PSA SERPL-MCNC: 0.53 NG/ML (ref 0–3.5)
SODIUM SERPL-SCNC: 135 MMOL/L (ref 136–145)
SPECIMEN DESCRIPTION: NORMAL
URATE SERPL-MCNC: 6.9 MG/DL (ref 3.4–7)

## 2022-11-14 PROCEDURE — 80048 BASIC METABOLIC PNL TOTAL CA: CPT | Performed by: INTERNAL MEDICINE

## 2022-11-14 PROCEDURE — 99396 PREV VISIT EST AGE 40-64: CPT | Mod: 25 | Performed by: INTERNAL MEDICINE

## 2022-11-14 PROCEDURE — 90471 IMMUNIZATION ADMIN: CPT | Performed by: INTERNAL MEDICINE

## 2022-11-14 PROCEDURE — 84550 ASSAY OF BLOOD/URIC ACID: CPT | Performed by: INTERNAL MEDICINE

## 2022-11-14 PROCEDURE — G0103 PSA SCREENING: HCPCS | Performed by: INTERNAL MEDICINE

## 2022-11-14 PROCEDURE — 81240 F2 GENE: CPT | Performed by: INTERNAL MEDICINE

## 2022-11-14 PROCEDURE — 90714 TD VACC NO PRESV 7 YRS+ IM: CPT | Performed by: INTERNAL MEDICINE

## 2022-11-14 PROCEDURE — 36415 COLL VENOUS BLD VENIPUNCTURE: CPT | Performed by: INTERNAL MEDICINE

## 2022-11-14 PROCEDURE — 86147 CARDIOLIPIN ANTIBODY EA IG: CPT | Mod: 59 | Performed by: INTERNAL MEDICINE

## 2022-11-14 PROCEDURE — G0452 MOLECULAR PATHOLOGY INTERPR: HCPCS | Mod: 26 | Performed by: PATHOLOGY

## 2022-11-14 PROCEDURE — 81241 F5 GENE: CPT | Performed by: INTERNAL MEDICINE

## 2022-11-14 RX ORDER — ALLOPURINOL 300 MG/1
300 TABLET ORAL DAILY
Qty: 90 TABLET | Refills: 3 | Status: SHIPPED | OUTPATIENT
Start: 2022-11-14 | End: 2023-12-05

## 2022-11-14 RX ORDER — TORSEMIDE 20 MG/1
20 TABLET ORAL DAILY
Qty: 90 TABLET | Refills: 3 | Status: SHIPPED | OUTPATIENT
Start: 2022-11-14 | End: 2023-12-05

## 2022-11-14 NOTE — PROGRESS NOTES
[facility]    Comprehensive and complex medical care - Chronic disease management - Shared decision making - Care coordination - Compassionate care    Patient advocacy - Rational deprescribing - Minimally disruptive medicine - Ethical focus - Customized care         Date of Service: 11/14/2022  Primary Provider: Noe Beckwith    Patient Care Team:  Noe Beckwith MD as PCP - General (Internal Medicine)  Kiran Laird MD as Referring Physician (General Surgery)  Noe Beckwith MD as Assigned PCP  Kiran Laird MD as Surgeon (Surgery)  Rajiv Davis (Ophthalmology)  Ho Ya MD as MD (Orthopaedic Surgery)          Patient's Pharmacy:    [facility], [address] [address]  Phone: [phone] Fax: [phone]     Patient's Contacts:  Name Home Phone Work Phone Mobile Phone Relationship Lgl Grd   LEIGH CAMERON [phone] NONE [phone] Spouse    RAKESHDOBLANK NONE NONE NONE Father      Patient's Insurance:    Payor: BCBS / Plan: BCBS OUT OF STATE / Product Type: Indemnity /           ROUTINE PHYSICAL    Active Problem List:  Problem List as of 11/14/2022 Reviewed: 11/14/2022 11:34 AM by Noe Beckwith MD       High    Former smoker - quit in 1986    PE (pulmonary thromboembolism) (H)       Medium    HTN (hypertension)    Gout    Status post laparoscopic sleeve gastrectomy       Low    Venous insufficiency    Morbid obesity (H)    Heart murmur    ANASTACIO on CPAP        Past Medical History:   Diagnosis Date     Arthritis      Bariatric surgery status 7/6/2015     Exposure to hepatitis C     wife had treatment for hep c     Former smoker      Fracture      Gout      Gout      Heart murmur 2/10/2020     HTN (hypertension)      HTN (hypertension)      Lower extremity venous stasis      Morbid obesity (H)      Morbid obesity with BMI of 50.0-59.9, adult  (H)      ANASTACIO on CPAP      ANASTACIO on CPAP     CPAP     Osteoarthritis of right hip 2015     Pneumonia      Pneumonia     as a child and as a teenager     Venous insufficiency      Past Surgical History:   Procedure Laterality Date     CATARACT EXTRACTION, BILATERAL Bilateral 2020     LAPAROSCOPIC GASTRIC SLEEVE N/A 2015    Procedure: LAPAROSCOPIC GASTRIC SLEEVE;  Surgeon: Kiran Laird MD;  Location:  OR     OTHER SURGICAL HISTORY  2015    Laparoscopic gastric sleeve surgery     ZZC TOTAL HIP ARTHROPLASTY Right 2019    Procedure: RIGHT TOTAL HIP ARTHROPLASTY, DIRECT ANTERIOR APPROACH;  Surgeon: Ho aY MD;  Location: [facility];  Service: Orthopedics     Social History     Occupational History     Not on file   Tobacco Use     Smoking status: Former     Packs/day: 1.00     Years: 7.00     Pack years: 7.00     Types: Cigarettes     Start date: 10/1/1982     Quit date: 1989     Years since quittin.3     Smokeless tobacco: Never     Tobacco comments:     casual smoker during high school and early [employer] days   Vaping Use     Vaping Use: Never used   Substance and Sexual Activity     Alcohol use: No     Comment: Alcoholic Drinks/day: rarely     Drug use: Not Currently     Types: Oxycodone     Sexual activity: Yes     Partners: Female     Birth control/protection: None     Comment: We are trying to have a third child at the present time      Social History     Social History Narrative    Patient of Dr. Beckwith since .     .      Family History   Problem Relation Age of Onset     Arthritis Mother      Arthritis Father      Osteoporosis Mother      Alcoholism Mother      Gout Father      No Known Problems Sister      No Known Problems Sister      No Known Problems Sister      No Known Problems Daughter      No Known Problems Daughter      No Known Problems Son      Family history is otherwise noncontributory.    Current Outpatient Medications   Medication  Instructions     allopurinol (ZYLOPRIM) 300 mg, Oral, DAILY     Calcium Citrate-Vitamin D (CALCIUM CITRATE + PO) 1,200-1,500 tablets, Oral     Cyanocobalamin (B-12) 1000 MCG SUBL Sublingual     multivitamin w/minerals (THERA-VIT-M) tablet 1 tablet, Oral, DAILY     rivaroxaban ANTICOAGULANT (XARELTO) 20 mg, Oral, DAILY     torsemide (DEMADEX) 20 mg, Oral, DAILY      Allergies: Lisinopril and Losartan     Immunization History   Administered Date(s) Administered     COVID-19,PF,Pfizer (12+ Yrs) 04/20/2021, 05/11/2021     Influenza (IIV3) PF 10/06/2014     Influenza Vaccine, 6+MO IM (QUADRIVALENT W/PRESERVATIVES) 10/06/2014, 09/29/2016, 11/06/2017     TD (ADULT, 7+) 10/26/2007, 11/14/2022     Td (Adult), Adsorbed 09/06/2001     Tdap (Adacel,Boostrix) 11/11/2011             Alvin Fitzpatrick is a 57 year old male coming in today for:    Chief Complaint   Patient presents with     Physical      He does not have other issues outside the physical to discuss as well.    Patient is in today for routine physical.    We reviewed his recent diagnosis of pulmonary embolism.  He is doing well with this.  He has had no pain at this time.  He had evidence of pulmonary infarct.  He is on Xarelto.  He is tolerating this well.  He had 1 cut with a little excess of bleeding, but is otherwise doing well.    We reviewed his high blood pressure and his blood pressure is doing well today there is no concerns.    We reviewed NSAIDs with him in relationship to his anticoagulation.    We reviewed vaccination and he will get tetanus update today.  He declines flu and COVID booster.  We reviewed Shingrix and he will consider this.  We went over the details of this vaccine.  He will consider the booster as well in the future.  He wants to review with his wife.    Reviewed colon cancer screening and he agrees to proceed with Cologuard at this time.  We reviewed colonoscopy and the positives and negatives of this.    We reviewed gout and there is  "been no issues with gout on allopurinol.    We reviewed the importance of working on his weight in relationship to blood clots.    His sleep apnea is doing well on the CPAP.    We reviewed his other issues noted in the assessment but not specifically addressed in the HPI above.     Exam:     Wt Readings from Last 3 Encounters:   11/14/22 108 kg (238 lb)   10/24/22 108.9 kg (240 lb)   10/17/22 109.4 kg (241 lb 3.2 oz)     BP Readings from Last 3 Encounters:   11/14/22 122/68   10/24/22 132/72   10/17/22 135/85     /68 (BP Location: Right arm, Patient Position: Sitting, Cuff Size: Adult Regular)   Pulse 68   Temp 98.7  F (37.1  C) (Oral)   Ht 1.651 m (5' 5\")   Wt 108 kg (238 lb)   BMI 39.61 kg/m     The patient is in no acute distress.  Mood good.  Insight good.  No skin lesions or nodules of concern.  Ears are clear.  Nose is clear.  Throat is clear.  Neck is supple  and there is no thyromegaly. No cervical, epitrochlear or axillary adenopathy.  Heart regular rate and rhythm. Lungs clear to auscultation bilaterally.  Respiratory effort is good.  Abdomen is soft, nontender.  No hepatosplenomegaly.  Extremities show stable trace edema with chronic venous stasis changes.       Diagnostics:     Office Visit on 10/17/2022   Component Date Value Ref Range Status     Erythrocyte Sedimentation Rate 10/17/2022 31 (H)  0 - 15 mm/hr Final     CRP Inflammation 10/17/2022 84.30 (H)  <5.00 mg/L Final     Sodium 10/17/2022 138  136 - 145 mmol/L Final     Potassium 10/17/2022 4.6  3.4 - 5.3 mmol/L Final     Chloride 10/17/2022 97 (L)  98 - 107 mmol/L Final     Carbon Dioxide (CO2) 10/17/2022 28  22 - 29 mmol/L Final     Anion Gap 10/17/2022 13  7 - 15 mmol/L Final     Urea Nitrogen 10/17/2022 18.4  6.0 - 20.0 mg/dL Final     Creatinine 10/17/2022 0.76  0.67 - 1.17 mg/dL Final     Calcium 10/17/2022 9.9  8.6 - 10.0 mg/dL Final     Glucose 10/17/2022 101 (H)  70 - 99 mg/dL Final     Alkaline Phosphatase 10/17/2022 96  40 - " 129 U/L Final     AST 10/17/2022 22  10 - 50 U/L Final     ALT 10/17/2022 10  10 - 50 U/L Final     Protein Total 10/17/2022 8.5 (H)  6.4 - 8.3 g/dL Final     Albumin 10/17/2022 4.5  3.5 - 5.2 g/dL Final     Bilirubin Total 10/17/2022 0.3  <=1.2 mg/dL Final     GFR Estimate 10/17/2022 >90  >60 mL/min/1.73m2 Final    Effective December 21, 2021 eGFRcr in adults is calculated using the 2021 CKD-EPI creatinine equation which includes age and gender (Nicholas et al., NEJ, DOI: 10.1056/HERWrc1993417)     Neutrophil Cytoplasmic Antibody 10/17/2022 <1:10  <1:10 Final     Neutrophil Cytoplasmic Antibody Pa* 10/17/2022 The ANCA IFA is <1:10.  No further testing will be performed.   Final     WBC Count 10/17/2022 5.4  4.0 - 11.0 10e3/uL Final     RBC Count 10/17/2022 4.44  4.40 - 5.90 10e6/uL Final     Hemoglobin 10/17/2022 14.8  13.3 - 17.7 g/dL Final     Hematocrit 10/17/2022 45.0  40.0 - 53.0 % Final     MCV 10/17/2022 101 (H)  78 - 100 fL Final     MCH 10/17/2022 33.3 (H)  26.5 - 33.0 pg Final     MCHC 10/17/2022 32.9  31.5 - 36.5 g/dL Final     RDW 10/17/2022 12.1  10.0 - 15.0 % Final     Platelet Count 10/17/2022 179  150 - 450 10e3/uL Final     % Neutrophils 10/17/2022 68  % Final     % Lymphocytes 10/17/2022 20  % Final     % Monocytes 10/17/2022 10  % Final     % Eosinophils 10/17/2022 2  % Final     % Basophils 10/17/2022 0  % Final     % Immature Granulocytes 10/17/2022 0  % Final     Absolute Neutrophils 10/17/2022 3.6  1.6 - 8.3 10e3/uL Final     Absolute Lymphocytes 10/17/2022 1.1  0.8 - 5.3 10e3/uL Final     Absolute Monocytes 10/17/2022 0.5  0.0 - 1.3 10e3/uL Final     Absolute Eosinophils 10/17/2022 0.1  0.0 - 0.7 10e3/uL Final     Absolute Basophils 10/17/2022 0.0  0.0 - 0.2 10e3/uL Final     Absolute Immature Granulocytes 10/17/2022 0.0  <=0.4 10e3/uL Final     Histoplasma Antigen, Serum 10/17/2022 Not Detected  Not Detected Final    Performed By: [facility]  [address] [place]  [address]  : Melvin Díaz MD, PhD     Histoplasma Antigen, Serum Interp 10/17/2022 Not Detected  Not Detected Final    Comment: INTERPRETIVE INFORMATION: Histoplasma Antigen Quantitative   by EIA, Serum    Less than 0.19 ng/mL = Not Detected  0.19-60.0 ng/mL = Detected  Greater than 60.0 ng/mL = Detected (above the limit of   quantification).    The quantitative range of this assay is 0.19-60.0 ng/mL.   Antigen concentrations greater than 60.0 ng/mL fall outside   the linear range of the assay and cannot be accurately   quantified.    This EIA test should be used in conjunction with other   diagnostic procedures, including microbiological culture,   histological examination of biopsy samples, and/or   radiographic evidence, to aid in the diagnosis of   histoplasmosis.    Crossreactivity with Blastomyces dermatiditis, Coccidioides   immitis and possibly Talaromyces marneffei have been   observed with this EIA.  Other clinically and   geographically relevant endemic mycoses should be   considered in the case of a positive test result.    This test was developed and its performance characteristics   determined by                            [facility]. It has not been cleared or   approved by the US Food and Drug Administration. This test   was performed in a CLIA certified laboratory and is   intended for clinical purposes.        No results found for any visits on 11/14/22.     Assessment:     1. Routine physical examination    2. Screen for colon cancer    3. PE (pulmonary thromboembolism) (H)    4. Primary hypertension    5. Chronic gout of multiple sites, unspecified cause    6. Screening for prostate cancer        Plan:     1. Tetanus booster done today.  2. Cologuard ordered.  3. Limited hypercoagulable work-up given anticoagulation.  Complete in the future when off of anticoagulation.  4. Plan is to continue Xarelto for 1 year at this point.  5. Likely will need follow-up again in  the next 3 to 6 months.  6. Continue current medications otherwise.  7. Follow up sooner if issues.    Orders Placed This Encounter   Procedures     TD PRESERV FREE, IM (7+ YRS) (DECAVAC/TENIVAC)     Cardiolipin Panel IgA IgG and IgM     Basic metabolic panel     Uric acid     Prostate Specific Antigen Screen     COLOGUARD(EXACT SCIENCES)           Noe Beckwith MD  General Internal Medicine  [facility]    Return in about 6 months (around 5/14/2023), or if symptoms worsen or fail to improve, for follow up visit.     No future appointments.

## 2022-11-18 LAB
CARDIOLIPIN IGA SER IA-ACNC: 5.3 APL-U/ML
CARDIOLIPIN IGA SER IA-ACNC: NEGATIVE
CARDIOLIPIN IGG SER IA-ACNC: <2 GPL-U/ML
CARDIOLIPIN IGG SER IA-ACNC: NEGATIVE
CARDIOLIPIN IGM SER IA-ACNC: <2 MPL-U/ML
CARDIOLIPIN IGM SER IA-ACNC: NEGATIVE

## 2022-12-05 LAB — NONINV COLON CA DNA+OCC BLD SCRN STL QL: NEGATIVE

## 2023-09-12 ENCOUNTER — MYC MEDICAL ADVICE (OUTPATIENT)
Dept: INTERNAL MEDICINE | Facility: CLINIC | Age: 58
End: 2023-09-12
Payer: COMMERCIAL

## 2023-09-12 DIAGNOSIS — I26.99 PE (PULMONARY THROMBOEMBOLISM) (H): ICD-10-CM

## 2023-09-18 ENCOUNTER — MYC MEDICAL ADVICE (OUTPATIENT)
Dept: INTERNAL MEDICINE | Facility: CLINIC | Age: 58
End: 2023-09-18
Payer: COMMERCIAL

## 2023-09-18 DIAGNOSIS — I26.99 PE (PULMONARY THROMBOEMBOLISM) (H): ICD-10-CM

## 2023-12-05 DIAGNOSIS — I10 PRIMARY HYPERTENSION: ICD-10-CM

## 2023-12-05 DIAGNOSIS — M1A.09X0 CHRONIC GOUT OF MULTIPLE SITES, UNSPECIFIED CAUSE: ICD-10-CM

## 2023-12-05 RX ORDER — TORSEMIDE 20 MG/1
20 TABLET ORAL DAILY
Qty: 45 TABLET | Refills: 0 | Status: SHIPPED | OUTPATIENT
Start: 2023-12-05 | End: 2024-01-05

## 2023-12-05 RX ORDER — ALLOPURINOL 300 MG/1
1 TABLET ORAL DAILY
Qty: 45 TABLET | Refills: 0 | Status: SHIPPED | OUTPATIENT
Start: 2023-12-05 | End: 2024-01-05

## 2023-12-21 ENCOUNTER — TRANSFERRED RECORDS (OUTPATIENT)
Dept: HEALTH INFORMATION MANAGEMENT | Facility: CLINIC | Age: 58
End: 2023-12-21
Payer: COMMERCIAL

## 2024-01-04 ASSESSMENT — ENCOUNTER SYMPTOMS
NAUSEA: 0
JOINT SWELLING: 0
SORE THROAT: 0
CONSTIPATION: 0
DYSURIA: 0
PARESTHESIAS: 0
FREQUENCY: 0
HEARTBURN: 0
DIARRHEA: 0
WEAKNESS: 0
EYE PAIN: 0
COUGH: 0
HEMATOCHEZIA: 0
HEADACHES: 0
NERVOUS/ANXIOUS: 0
HEMATURIA: 0
CHILLS: 1
MYALGIAS: 0
ABDOMINAL PAIN: 0
ARTHRALGIAS: 1
FEVER: 0
SHORTNESS OF BREATH: 0
PALPITATIONS: 0
DIZZINESS: 0

## 2024-01-05 ENCOUNTER — OFFICE VISIT (OUTPATIENT)
Dept: INTERNAL MEDICINE | Facility: CLINIC | Age: 59
End: 2024-01-05
Payer: COMMERCIAL

## 2024-01-05 VITALS
DIASTOLIC BLOOD PRESSURE: 54 MMHG | SYSTOLIC BLOOD PRESSURE: 112 MMHG | HEART RATE: 78 BPM | OXYGEN SATURATION: 97 % | WEIGHT: 250.9 LBS | HEIGHT: 66 IN | BODY MASS INDEX: 40.32 KG/M2 | RESPIRATION RATE: 12 BRPM | TEMPERATURE: 98 F

## 2024-01-05 DIAGNOSIS — Z98.84 STATUS POST LAPAROSCOPIC SLEEVE GASTRECTOMY: ICD-10-CM

## 2024-01-05 DIAGNOSIS — R01.1 HEART MURMUR: ICD-10-CM

## 2024-01-05 DIAGNOSIS — E66.01 MORBID OBESITY (H): ICD-10-CM

## 2024-01-05 DIAGNOSIS — I10 PRIMARY HYPERTENSION: Chronic | ICD-10-CM

## 2024-01-05 DIAGNOSIS — G47.33 OSA ON CPAP: ICD-10-CM

## 2024-01-05 DIAGNOSIS — Z13.220 LIPID SCREENING: ICD-10-CM

## 2024-01-05 DIAGNOSIS — Z12.5 SCREENING FOR PROSTATE CANCER: ICD-10-CM

## 2024-01-05 DIAGNOSIS — M10.09 IDIOPATHIC GOUT OF MULTIPLE SITES, UNSPECIFIED CHRONICITY: ICD-10-CM

## 2024-01-05 DIAGNOSIS — M1A.09X0 CHRONIC GOUT OF MULTIPLE SITES, UNSPECIFIED CAUSE: ICD-10-CM

## 2024-01-05 DIAGNOSIS — R44.8 FEELS COLD: ICD-10-CM

## 2024-01-05 DIAGNOSIS — Z00.00 ROUTINE PHYSICAL EXAMINATION: Primary | ICD-10-CM

## 2024-01-05 DIAGNOSIS — I87.2 VENOUS INSUFFICIENCY: ICD-10-CM

## 2024-01-05 DIAGNOSIS — I26.99 PE (PULMONARY THROMBOEMBOLISM) (H): ICD-10-CM

## 2024-01-05 LAB
ALBUMIN SERPL BCG-MCNC: 4.3 G/DL (ref 3.5–5.2)
ALP SERPL-CCNC: 97 U/L (ref 40–150)
ALT SERPL W P-5'-P-CCNC: 16 U/L (ref 0–70)
ANION GAP SERPL CALCULATED.3IONS-SCNC: 11 MMOL/L (ref 7–15)
AST SERPL W P-5'-P-CCNC: 32 U/L (ref 0–45)
BILIRUB SERPL-MCNC: 0.3 MG/DL
BUN SERPL-MCNC: 10.3 MG/DL (ref 6–20)
CALCIUM SERPL-MCNC: 9.3 MG/DL (ref 8.6–10)
CHLORIDE SERPL-SCNC: 99 MMOL/L (ref 98–107)
CHOLEST SERPL-MCNC: 205 MG/DL
CREAT SERPL-MCNC: 0.71 MG/DL (ref 0.67–1.17)
DEPRECATED HCO3 PLAS-SCNC: 30 MMOL/L (ref 22–29)
EGFRCR SERPLBLD CKD-EPI 2021: >90 ML/MIN/1.73M2
ERYTHROCYTE [DISTWIDTH] IN BLOOD BY AUTOMATED COUNT: 13.1 % (ref 10–15)
FASTING STATUS PATIENT QL REPORTED: ABNORMAL
GLUCOSE SERPL-MCNC: 84 MG/DL (ref 70–99)
HCT VFR BLD AUTO: 43.1 % (ref 40–53)
HDLC SERPL-MCNC: 102 MG/DL
HGB BLD-MCNC: 14.5 G/DL (ref 13.3–17.7)
LDLC SERPL CALC-MCNC: 86 MG/DL
MCH RBC QN AUTO: 34.1 PG (ref 26.5–33)
MCHC RBC AUTO-ENTMCNC: 33.6 G/DL (ref 31.5–36.5)
MCV RBC AUTO: 101 FL (ref 78–100)
NONHDLC SERPL-MCNC: 103 MG/DL
NT-PROBNP SERPL-MCNC: 76 PG/ML (ref 0–900)
PLATELET # BLD AUTO: 173 10E3/UL (ref 150–450)
POTASSIUM SERPL-SCNC: 4.4 MMOL/L (ref 3.4–5.3)
PROT SERPL-MCNC: 8.1 G/DL (ref 6.4–8.3)
PSA SERPL DL<=0.01 NG/ML-MCNC: 0.56 NG/ML (ref 0–3.5)
RBC # BLD AUTO: 4.25 10E6/UL (ref 4.4–5.9)
SODIUM SERPL-SCNC: 140 MMOL/L (ref 135–145)
TRIGL SERPL-MCNC: 86 MG/DL
TSH SERPL DL<=0.005 MIU/L-ACNC: 6.31 UIU/ML (ref 0.3–4.2)
URATE SERPL-MCNC: 5.2 MG/DL (ref 3.4–7)
WBC # BLD AUTO: 4.3 10E3/UL (ref 4–11)

## 2024-01-05 PROCEDURE — 80053 COMPREHEN METABOLIC PANEL: CPT | Performed by: INTERNAL MEDICINE

## 2024-01-05 PROCEDURE — 83880 ASSAY OF NATRIURETIC PEPTIDE: CPT | Performed by: INTERNAL MEDICINE

## 2024-01-05 PROCEDURE — 84443 ASSAY THYROID STIM HORMONE: CPT | Performed by: INTERNAL MEDICINE

## 2024-01-05 PROCEDURE — 84550 ASSAY OF BLOOD/URIC ACID: CPT | Performed by: INTERNAL MEDICINE

## 2024-01-05 PROCEDURE — 80061 LIPID PANEL: CPT | Performed by: INTERNAL MEDICINE

## 2024-01-05 PROCEDURE — 36415 COLL VENOUS BLD VENIPUNCTURE: CPT | Performed by: INTERNAL MEDICINE

## 2024-01-05 PROCEDURE — 85027 COMPLETE CBC AUTOMATED: CPT | Performed by: INTERNAL MEDICINE

## 2024-01-05 PROCEDURE — 99213 OFFICE O/P EST LOW 20 MIN: CPT | Mod: 25 | Performed by: INTERNAL MEDICINE

## 2024-01-05 PROCEDURE — G0103 PSA SCREENING: HCPCS | Performed by: INTERNAL MEDICINE

## 2024-01-05 PROCEDURE — 99396 PREV VISIT EST AGE 40-64: CPT | Mod: 25 | Performed by: INTERNAL MEDICINE

## 2024-01-05 RX ORDER — ALLOPURINOL 300 MG/1
1 TABLET ORAL DAILY
Qty: 90 TABLET | Refills: 3 | Status: SHIPPED | OUTPATIENT
Start: 2024-01-05 | End: 2024-09-23

## 2024-01-05 RX ORDER — TORSEMIDE 20 MG/1
20 TABLET ORAL DAILY
Qty: 90 TABLET | Refills: 3 | Status: SHIPPED | OUTPATIENT
Start: 2024-01-05 | End: 2024-09-23

## 2024-01-05 NOTE — LETTER
January 12, 2024      Alvin vega Mike REEDER Amari  [address] [facility] [address] [address]        Dear ,    We are writing to inform you of your test results.    Your test results fall within the expected range(s) or remain unchanged from previous results.  Please continue with current treatment plan.    Resulted Orders   CBC with platelets   Result Value Ref Range    WBC Count 4.3 4.0 - 11.0 10e3/uL    RBC Count 4.25 (L) 4.40 - 5.90 10e6/uL    Hemoglobin 14.5 13.3 - 17.7 g/dL    Hematocrit 43.1 40.0 - 53.0 %     (H) 78 - 100 fL    MCH 34.1 (H) 26.5 - 33.0 pg    MCHC 33.6 31.5 - 36.5 g/dL    RDW 13.1 10.0 - 15.0 %    Platelet Count 173 150 - 450 10e3/uL       If you have any questions or concerns, please call the clinic at the number listed above.       Sincerely,      Noe Beckwith MD

## 2024-01-05 NOTE — PROGRESS NOTES
[facility]    Comprehensive and complex medical care - Chronic disease management - Shared decision making - Care coordination - Compassionate care    Patient advocacy - Rational deprescribing - Minimally disruptive medicine - Ethical focus - Customized care         Date of Service: 1/5/2024  Primary Provider: Noe Beckwith    Patient Care Team:  Noe Beckwith MD as PCP - General (Internal Medicine)  Kiran Laird MD as Referring Physician (General Surgery)  Noe Beckwith MD as Assigned PCP  Kiran Laird MD as Surgeon (Surgery)  Rajiv Davis (Ophthalmology)  Ho Ya MD as MD (Orthopaedic Surgery)          Patient's Pharmacy:    [facility] [address]  Phone: [phone] Fax: [phone]     Patient's Contacts:  Name Home Phone Work Phone Mobile Phone Relationship Lgl Grd   LEIGH CAMERON  [phone] Spouse    ROSAMARIA CAMERON   [phone] Father      Patient's Insurance:    Payor: BCBS / Plan: BCBS OUT OF STATE / Product Type: Indemnity /            ROUTINE PHYSICAL    Active Problem List:  Problem List as of 1/5/2024 Reviewed: 11/14/2022 11:34 AM by Noe Beckwith MD         High    Former smoker - quit in 1986    PE (pulmonary thromboembolism) (H)       Medium    Primary hypertension    Gout    Status post laparoscopic sleeve gastrectomy       Low    Venous insufficiency    Morbid obesity (H)    Heart murmur    ANASTACIO on CPAP        Past Medical History:   Diagnosis Date    Arthritis     Bariatric surgery status 07/06/2015    Exposure to hepatitis C     wife had treatment for hep c    Former smoker     Fracture     Gout     Gout     Heart murmur 02/10/2020    HTN (hypertension)     Lower extremity venous stasis     Morbid obesity (H)     ANASTACIO on CPAP     Osteoarthritis of right hip 08/11/2015    Pneumonia     Pneumonia      as a child and as a teenager    Venous insufficiency      Past Surgical History:   Procedure Laterality Date    CATARACT EXTRACTION, BILATERAL Bilateral 2020    LAPAROSCOPIC GASTRIC SLEEVE N/A 2015    Procedure: LAPAROSCOPIC GASTRIC SLEEVE;  Surgeon: Kiran Laird MD;  Location: [facility] OR    TOTAL HIP ARTHROPLASTY Right 2019    Procedure: RIGHT TOTAL HIP ARTHROPLASTY, DIRECT ANTERIOR APPROACH;  Surgeon: Ho Ya MD;  Location: [facility];  Service: Orthopedics     Social History     Occupational History    Not on file   Tobacco Use    Smoking status: Former     Packs/day: 1.00     Years: 7.00     Additional pack years: 0.00     Total pack years: 7.00     Types: Cigarettes     Start date: 10/1/1982     Quit date: 1989     Years since quittin.5    Smokeless tobacco: Never    Tobacco comments:     casual smoker during high school and early [employer] days   Vaping Use    Vaping Use: Never used   Substance and Sexual Activity    Alcohol use: No     Comment: Alcoholic Drinks/day: rarely    Drug use: Not Currently     Types: Oxycodone    Sexual activity: Yes     Partners: Female     Birth control/protection: None     Comment: We are trying to have a third child at the present time      Social History     Social History Narrative    Patient of Dr. Beckwith since .     .      Family History   Problem Relation Age of Onset    Arthritis Mother     Osteoporosis Mother     Alcoholism Mother     Arthritis Father     Gout Father     No Known Problems Sister     No Known Problems Sister     No Known Problems Sister     No Known Problems Daughter     No Known Problems Daughter     GERD Son      Family history is otherwise noncontributory.    Current Outpatient Medications   Medication Instructions    allopurinol (ZYLOPRIM) 300 mg, Oral, DAILY    Calcium Citrate-Vitamin D (CALCIUM CITRATE + PO) 1,200-1,500 tablets, Oral    Cyanocobalamin (B-12) 1000 MCG SUBL Sublingual     multivitamin w/minerals (THERA-VIT-M) tablet 1 tablet, Oral, DAILY    rivaroxaban ANTICOAGULANT (XARELTO) 20 mg, Oral, DAILY, Needs refill on the road.    torsemide (DEMADEX) 20 mg, Oral, DAILY      Allergies: Lisinopril and Losartan     Immunization History   Administered Date(s) Administered    COVID-19 MONOVALENT 12+ (Pfizer) 04/20/2021, 05/11/2021    Influenza (IIV3) PF 10/06/2014    Influenza Vaccine, 6+MO IM (QUADRIVALENT W/PRESERVATIVES) 10/06/2014, 09/29/2016, 11/06/2017    TD,PF 7+ (Tenivac) 10/26/2007, 11/14/2022    TDAP (Adacel,Boostrix) 11/11/2011    Td (Adult), Adsorbed 09/06/2001             Alvin Fitzpatrick is a 58 year old male coming in today for:    Chief Complaint   Patient presents with    Annual Visit          1/5/2024     9:42 AM   Additional Questions   Roomed by Howie HAGEN     He does have other issues outside the physical to discuss as well.    Patient generally doing well without issues.    Blood pressure doing well on current medications.    Reviewed his history of gout and no gout type symptoms.    Reviewed his history of pulmonary embolism and he has had no recurrent symptoms.    Has had sleeve gastrectomy in the past and still doing okay related to the weight.  Some lower extremity swelling with occasional bumps to the skin.    He does feel cold at times.  This was particularly notable when he started the Xarelto.    He does have some issues with bowels feeling incompletely emptied when defecating and after defecating.  He denies any blood in his stools.  He goes once a day.  No significant constipation noted.    We reviewed his other issues noted in the assessment but not specifically addressed in the HPI above.     Exam:     Wt Readings from Last 3 Encounters:   01/05/24 113.8 kg (250 lb 14.4 oz)   11/14/22 108 kg (238 lb)   10/24/22 108.9 kg (240 lb)     BP Readings from Last 3 Encounters:   01/05/24 112/54   11/14/22 122/68   10/24/22 132/72     /54   Pulse 78   Temp 98  F  "(36.7  C) (Oral)   Resp 12   Ht 1.676 m (5' 6\")   Wt 113.8 kg (250 lb 14.4 oz)   SpO2 97%   BMI 40.50 kg/m     The patient is in no acute distress.  Mood good.  Insight good.  No skin lesions or nodules of concern.  Ears are clear.  Nose is clear.  Throat is clear.  Neck is supple  and there is no thyromegaly. No cervical, epitrochlear or axillary adenopathy.  Heart regular rate and rhythm.  There is a 2/6 systolic ejection quality murmur in the aortic position.  Lungs clear to auscultation bilaterally.  Respiratory effort is good.  Abdomen is soft, nontender.  No hepatosplenomegaly.  Extremities show trace edema.  Venous stasis changes noted in the lower legs with thinner skin and easier injury.       Diagnostics:     Office Visit on 11/14/2022   Component Date Value Ref Range Status    COLOGUARD-ABSTRACT 11/30/2022 Negative  Negative Final    Comment:   NEGATIVE TEST RESULT. A negative Cologuard result indicates a low likelihood that a colorectal cancer (CRC) or advanced adenoma (adenomatous polyps with more advanced pre-malignant features)  is present. The chance that a person with a negative Cologuard test has a colorectal cancer is less than 1 in 1500 (negative predictive value >99.9%) or has an  advanced adenoma is less than  5.3% (negative predictive value 94.7%). These data are based on a prospective cross-sectional study of 10,000 individuals at average risk for colorectal cancer who were screened with both Cologuard and colonoscopy. (Harini BRYSON et al, N Engl J Med 2014;370(14):9933-4578) The normal value (reference range) for this assay is negative.    COLOGUARD RE-SCREENING RECOMMENDATION: Periodic colorectal cancer screening is an important part of preventive healthcare for asymptomatic individuals at average risk for colorectal cancer.  Following a negative Cologuard result, the American Cancer Society and U.S.                            Multi-Society Task Force screening guidelines recommend a " Cologuard re-screening interval of 3 years.   References: American Cancer Society Guideline for Colorectal Cancer Screening: https://www.cancer.org/cancer/colon-rectal-cancer/jayluwsew-bboaufbya-xakdvld/acs-recommendations.html.; Magnus PRIETO, Cassidy HENSON, Theo EDWARDS, Colorectal Cancer Screening: Recommendations for Physicians and Patients from the U.S. Multi-Society Task Force on Colorectal Cancer Screening , Am J Gastroenterology 2017; 112:2854-6118.    TEST DESCRIPTION: Composite algorithmic analysis of stool DNA-biomarkers with hemoglobin immunoassay.   Quantitative values of individual biomarkers are not reportable and are not associated with individual biomarker result reference ranges. Cologuard is intended for colorectal cancer screening of adults of either sex, 45 years or older, who are at average-risk for colorectal cancer (CRC). Cologuard has been approved for use by the U.S. FDA. The performance of Cologuard was                            established in a cross sectional study of average-risk adults aged 50-84. Cologuard performance in patients ages 45 to 49 years was estimated by sub-group analysis of near-age groups. Colonoscopies performed for a positive result may find as the most clinically significant lesion: colorectal cancer [4.0%], advanced adenoma (including sessile serrated polyps greater than or equal to 1cm diameter) [20%] or non- advanced adenoma [31%]; or no colorectal neoplasia [45%]. These estimates are derived from a prospective cross-sectional screening study of 10,000 individuals at average risk for colorectal cancer who were screened with both Cologuard and colonoscopy. (Harini HAGEN. et al, N Engl J Med 2014;370(14):1075-4541.) Cologuard may produce a false negative or false positive result (no colorectal cancer or precancerous polyp present at colonoscopy follow up). A negative Cologuard test result does not guarantee the absence of CRC or advanced adenoma (pre-cancer). The current  Cologuard                            screening interval is every 3 years. (American Cancer Society and U.S. Multi-Society Task Force). Cologuard performance data in a 10,000 patient pivotal study using colonoscopy as the reference method can be accessed at the following location: www.PneumRx/results. Additional description of the Cologuard test process, warnings and precautions can be found at [email or web address].      Prostate Specific Antigen Screen 11/14/2022 0.53  0.00 - 3.50 ng/mL Final    Uric Acid 11/14/2022 6.9  3.4 - 7.0 mg/dL Final    Sodium 11/14/2022 135 (L)  136 - 145 mmol/L Final    Potassium 11/14/2022 4.7  3.4 - 5.3 mmol/L Final    Chloride 11/14/2022 97 (L)  98 - 107 mmol/L Final    Carbon Dioxide (CO2) 11/14/2022 27  22 - 29 mmol/L Final    Anion Gap 11/14/2022 11  7 - 15 mmol/L Final    Urea Nitrogen 11/14/2022 15.0  6.0 - 20.0 mg/dL Final    Creatinine 11/14/2022 0.76  0.67 - 1.17 mg/dL Final    Calcium 11/14/2022 9.3  8.6 - 10.0 mg/dL Final    Glucose 11/14/2022 96  70 - 99 mg/dL Final    GFR Estimate 11/14/2022 >90  >60 mL/min/1.73m2 Final    Effective December 21, 2021 eGFRcr in adults is calculated using the 2021 CKD-EPI creatinine equation which includes age and gender (Nicholas et al., NEJM, DOI: 10.1056/SONIio6099576)    Cardiolipin Niki IgA Instrument Cynthia* 11/14/2022 5.3  <14.0 APL-U/mL Final    Cardiolipin Antibody IgA 11/14/2022 Negative  Negative Final    Cardiolipin Niki IgG Instrument Cynthia* 11/14/2022 <2.0  <10.0 GPL-U/mL Final    Cardiolipin Antibody IgG 11/14/2022 Negative  Negative Final    Cardiolipin Niki IgM Instrument Cynthia* 11/14/2022 <2.0  <10.0 MPL-U/mL Final    Cardiolipin Antibody IgM 11/14/2022 Negative  Negative Final    METHODOLOGY 11/14/2022    Final                    Value:This result contains rich text formatting which cannot be displayed here.    RESULTS 11/14/2022    Final                    Value:This result contains rich text formatting which cannot be displayed  here.    INTERPRETATION 11/14/2022    Final                    Value:This result contains rich text formatting which cannot be displayed here.    COMMENTS 11/14/2022    Final                    Value:This result contains rich text formatting which cannot be displayed here.    DISCLAIMER 11/14/2022    Final                    Value:This result contains rich text formatting which cannot be displayed here.    FACTOR 2 INTERPRETATION 11/14/2022    Final                    Value:This result contains rich text formatting which cannot be displayed here.    FACTOR V INTERPRETATION 11/14/2022    Final                    Value:This result contains rich text formatting which cannot be displayed here.    Specimen Description 11/14/2022    Final                    Value:This result contains rich text formatting which cannot be displayed here.        Results for orders placed or performed in visit on 01/05/24   CBC with platelets     Status: Abnormal   Result Value Ref Range    WBC Count 4.3 4.0 - 11.0 10e3/uL    RBC Count 4.25 (L) 4.40 - 5.90 10e6/uL    Hemoglobin 14.5 13.3 - 17.7 g/dL    Hematocrit 43.1 40.0 - 53.0 %     (H) 78 - 100 fL    MCH 34.1 (H) 26.5 - 33.0 pg    MCHC 33.6 31.5 - 36.5 g/dL    RDW 13.1 10.0 - 15.0 %    Platelet Count 173 150 - 450 10e3/uL        Assessment:     1. Routine physical examination    2. PE (pulmonary thromboembolism) (H)   This was idiopathic in the past.  We discussed this again today.  Continue anticoagulation.   3. Morbid obesity (H)   Continue to work on weight loss.  Used to be over 400 pounds.  Still doing well from this standpoint.  Some gradual weight creep.   4. Screening for prostate cancer    5. Lipid screening    6. Primary hypertension    7. Idiopathic gout of multiple sites, unspecified chronicity    8. Chronic gout of multiple sites, unspecified cause    9. Feels cold    10. Heart murmur    11. Venous insufficiency    12. ANASTACIO on CPAP    13. Status post laparoscopic sleeve  gastrectomy        Plan:     Lab work done today.  He is okay with his current vaccination status.  He has gotten ill with flu shots in the past.  Let us know if abdominal symptoms worsen over time.  Continue current medications otherwise.  Follow up sooner if issues.    Orders Placed This Encounter   Procedures    CBC with platelets    Comprehensive metabolic panel    Uric acid    Prostate Specific Antigen Screen    Lipid panel reflex to direct LDL Fasting    TSH    N terminal pro BNP outpatient           Noe Beckwith MD  General Internal Medicine  [facility]    Return in about 1 year (around 1/5/2025) for physical, visit and blood work.     No future appointments.        Wt Readings from Last 20 Encounters:   01/05/24 113.8 kg (250 lb 14.4 oz)   11/14/22 108 kg (238 lb)   10/24/22 108.9 kg (240 lb)   10/17/22 109.4 kg (241 lb 3.2 oz)   09/23/22 108 kg (238 lb)   09/13/22 112.1 kg (247 lb 3.2 oz)   02/15/21 107.5 kg (236 lb 14.4 oz)   12/22/20 106.5 kg (234 lb 14.4 oz)   02/10/20 110.1 kg (242 lb 12.8 oz)   05/02/19 108.1 kg (238 lb 4.8 oz)   04/22/19 108.9 kg (240 lb)   01/11/19 109.4 kg (241 lb 3.2 oz)   11/06/17 103.4 kg (227 lb 14.4 oz)   09/06/17 98.9 kg (218 lb)   10/07/16 96.2 kg (212 lb 1.6 oz)   09/29/16 95.7 kg (211 lb)   01/27/16 109.5 kg (241 lb 6.4 oz)   12/14/15 116.1 kg (256 lb)   12/03/15 116.1 kg (256 lb)   10/02/15 123.7 kg (272 lb 9.6 oz)     BP Readings from Last 20 Encounters:   01/05/24 112/54   11/14/22 122/68   10/24/22 132/72   10/17/22 135/85   09/23/22 (!) 141/82   09/13/22 139/88   02/15/21 132/60   12/22/20 132/70   02/10/20 136/84   09/06/17 125/69   10/07/16 115/69   01/27/16 106/66   10/02/15 110/68   07/30/15 112/64   07/09/15 117/64   07/01/15 145/78   06/17/15 119/75   06/12/15 140/65   06/12/15 127/70   06/02/15 130/79      Pulse Readings from Last 20 Encounters:   01/05/24 78   11/14/22 68   10/24/22 67   10/17/22 80   09/23/22 62   09/13/22 67    02/15/21 84   12/22/20 85   02/10/20 83   09/06/17 58   10/07/16 56   01/27/16 62   10/02/15 66   07/30/15 82   07/09/15 76   07/01/15 96   06/17/15 87   06/12/15 77   06/12/15 87   06/02/15 74     SpO2 Readings from Last 20 Encounters:   01/05/24 97%   10/24/22 98%   10/17/22 97%   09/23/22 100%   09/13/22 98%   02/15/21 95%   12/22/20 95%   02/10/20 100%   09/06/17 100%   10/07/16 98%   01/27/16 98%   10/02/15 100%   07/30/15 99%   07/09/15 99%   07/01/15 92%   06/17/15 98%   06/12/15 100%   06/02/15 96%   05/05/15 97%   03/25/15 92%

## 2024-09-23 ENCOUNTER — MYC MEDICAL ADVICE (OUTPATIENT)
Dept: INTERNAL MEDICINE | Facility: CLINIC | Age: 59
End: 2024-09-23
Payer: COMMERCIAL

## 2024-09-23 DIAGNOSIS — M1A.09X0 CHRONIC GOUT OF MULTIPLE SITES, UNSPECIFIED CAUSE: ICD-10-CM

## 2024-09-23 DIAGNOSIS — I10 PRIMARY HYPERTENSION: Chronic | ICD-10-CM

## 2024-09-23 RX ORDER — TORSEMIDE 20 MG/1
20 TABLET ORAL DAILY
Qty: 90 TABLET | Refills: 3 | Status: SHIPPED | OUTPATIENT
Start: 2024-09-23

## 2024-09-23 RX ORDER — ALLOPURINOL 300 MG/1
1 TABLET ORAL DAILY
Qty: 90 TABLET | Refills: 3 | Status: SHIPPED | OUTPATIENT
Start: 2024-09-23

## 2024-09-23 NOTE — TELEPHONE ENCOUNTER
Pt is requesting refill on allopurinol 300 MG, and toresmide 20 MG.    Pt has 4 tablets left of each, LOV 1/2024. Medication pended per provider approval.    Writer updated pharmacy per pt request.    Will route to PCP to advise.    CARLOS Phillip.

## 2025-01-16 ENCOUNTER — OFFICE VISIT (OUTPATIENT)
Dept: INTERNAL MEDICINE | Facility: CLINIC | Age: 60
End: 2025-01-16
Payer: COMMERCIAL

## 2025-01-16 VITALS
TEMPERATURE: 97.7 F | OXYGEN SATURATION: 99 % | HEIGHT: 64 IN | WEIGHT: 250 LBS | DIASTOLIC BLOOD PRESSURE: 70 MMHG | RESPIRATION RATE: 16 BRPM | SYSTOLIC BLOOD PRESSURE: 126 MMHG | BODY MASS INDEX: 42.68 KG/M2 | HEART RATE: 77 BPM

## 2025-01-16 DIAGNOSIS — I10 PRIMARY HYPERTENSION: ICD-10-CM

## 2025-01-16 DIAGNOSIS — I26.99 PE (PULMONARY THROMBOEMBOLISM) (H): ICD-10-CM

## 2025-01-16 DIAGNOSIS — M25.512 CHRONIC PAIN OF BOTH SHOULDERS: ICD-10-CM

## 2025-01-16 DIAGNOSIS — M25.511 CHRONIC PAIN OF BOTH SHOULDERS: ICD-10-CM

## 2025-01-16 DIAGNOSIS — M10.09 IDIOPATHIC GOUT OF MULTIPLE SITES, UNSPECIFIED CHRONICITY: ICD-10-CM

## 2025-01-16 DIAGNOSIS — I87.2 VENOUS INSUFFICIENCY: ICD-10-CM

## 2025-01-16 DIAGNOSIS — G47.33 OSA ON CPAP: ICD-10-CM

## 2025-01-16 DIAGNOSIS — Z12.5 SCREENING FOR PROSTATE CANCER: ICD-10-CM

## 2025-01-16 DIAGNOSIS — Z98.84 STATUS POST LAPAROSCOPIC SLEEVE GASTRECTOMY: ICD-10-CM

## 2025-01-16 DIAGNOSIS — Z00.00 ROUTINE PHYSICAL EXAMINATION: Primary | ICD-10-CM

## 2025-01-16 DIAGNOSIS — G89.29 CHRONIC PAIN OF BOTH SHOULDERS: ICD-10-CM

## 2025-01-16 LAB
ERYTHROCYTE [DISTWIDTH] IN BLOOD BY AUTOMATED COUNT: 13.1 % (ref 10–15)
HCT VFR BLD AUTO: 42.5 % (ref 40–53)
HGB BLD-MCNC: 14.4 G/DL (ref 13.3–17.7)
MCH RBC QN AUTO: 34.5 PG (ref 26.5–33)
MCHC RBC AUTO-ENTMCNC: 33.9 G/DL (ref 31.5–36.5)
MCV RBC AUTO: 102 FL (ref 78–100)
PLATELET # BLD AUTO: 200 10E3/UL (ref 150–450)
RBC # BLD AUTO: 4.17 10E6/UL (ref 4.4–5.9)
WBC # BLD AUTO: 4.3 10E3/UL (ref 4–11)

## 2025-01-16 SDOH — HEALTH STABILITY: PHYSICAL HEALTH: ON AVERAGE, HOW MANY MINUTES DO YOU ENGAGE IN EXERCISE AT THIS LEVEL?: 50 MIN

## 2025-01-16 SDOH — HEALTH STABILITY: PHYSICAL HEALTH: ON AVERAGE, HOW MANY DAYS PER WEEK DO YOU ENGAGE IN MODERATE TO STRENUOUS EXERCISE (LIKE A BRISK WALK)?: 3 DAYS

## 2025-01-16 ASSESSMENT — SOCIAL DETERMINANTS OF HEALTH (SDOH): HOW OFTEN DO YOU GET TOGETHER WITH FRIENDS OR RELATIVES?: PATIENT DECLINED

## 2025-01-16 NOTE — PROGRESS NOTES
[facility] - [facility] Specialists    Comprehensive and complex medical care - Chronic disease management - Shared decision making - Care coordination - Compassionate care    Patient advocacy - Rational deprescribing - Minimally disruptive medicine - Ethical focus - Customized care         Date of Service: 1/16/2025  Primary Provider: Noe Beckwith    Patient Care Team:  Noe Beckwith MD as PCP - General (Internal Medicine)  Kiran Laird MD as Referring Physician (General Surgery)  Noe Beckwith MD as Assigned PCP  Kiran Laird MD as Surgeon (Surgery)  Rajiv Davis (Ophthalmology)  Ho Ya MD as MD (Orthopaedic Surgery)          Patient's Pharmacy:    [id number] [place] - [address] [facility]   [address] [place] [address]  Phone: [phone] Fax: [phone]     Patient's Contacts:  Name Home Phone Work Phone Mobile Phone Relationship Lgl Grd   LEIGH CAMERON  [phone] Spouse    ROSAMARIA CAMERON   [phone] Father      Patient's Insurance:    Payor: BCBS / Plan: BCBS OUT OF STATE / Product Type: Indemnity /            ROUTINE PHYSICAL    Active Problem List:  Problem List as of 1/16/2025 Reviewed: 1/16/2025  9:55 PM by Noe Beckwith MD         High    Former smoker - quit in 1986    PE (pulmonary thromboembolism) (H)       Medium    Primary hypertension    Gout    Status post laparoscopic sleeve gastrectomy       Low    Venous insufficiency    Morbid obesity (H)    Heart murmur    ANASTACIO on CPAP        Past Medical History:   Diagnosis Date    Bariatric surgery status 07/06/2015    Exposure to hepatitis C     wife had treatment for hep c    Former smoker     Gout     Gout     Heart murmur 02/10/2020    HTN (hypertension)     Lower extremity venous stasis     Morbid obesity (H)     ANASTACIO on CPAP     Osteoarthritis of right hip 08/11/2015    Pneumonia     as a child and as a teenager    Venous insufficiency       Past Surgical History:   Procedure Laterality Date    ABDOMEN SURGERY  2015    CATARACT EXTRACTION, BILATERAL Bilateral 2020    EYE SURGERY  2021    LAPAROSCOPIC GASTRIC SLEEVE N/A 2015    Procedure: LAPAROSCOPIC GASTRIC SLEEVE;  Surgeon: Kiran Laird MD;  Location:  OR    ORTHOPEDIC SURGERY  2019    TOTAL HIP ARTHROPLASTY Right 2019    Procedure: RIGHT TOTAL HIP ARTHROPLASTY, DIRECT ANTERIOR APPROACH;  Surgeon: Ho Ya MD;  Location: [facility];  Service: Orthopedics     Social History     Occupational History    Not on file   Tobacco Use    Smoking status: Former     Current packs/day: 0.00     Average packs/day: 1 pack/day for 7.0 years (7.0 ttl pk-yrs)     Types: Cigarettes     Start date: 10/1/1982     Quit date: 1989     Years since quittin.5    Smokeless tobacco: Never    Tobacco comments:     casual smoker during high school and early [employer] days   Vaping Use    Vaping status: Never Used   Substance and Sexual Activity    Alcohol use: Yes     Comment: infrequient    Drug use: Not Currently     Types: Marijuana    Sexual activity: Yes     Partners: Female     Birth control/protection: None     Comment: We are trying to have a third child at the present time      Social History     Social History Narrative    Patient of Dr. Beckwith since .     .      Family History   Problem Relation Age of Onset    Arthritis Mother     Osteoporosis Mother     Alcoholism Mother     Substance Abuse Mother             Obesity Mother     Arthritis Father     Gout Father     No Known Problems Sister     No Known Problems Sister     No Known Problems Sister     No Known Problems Daughter     No Known Problems Daughter     GERD Son      Family history is otherwise noncontributory.    Current Outpatient Medications   Medication Instructions    allopurinol (ZYLOPRIM) 300 mg, Oral, DAILY    Cyanocobalamin (B-12) 1000 MCG SUBL No dose, route, or frequency  recorded.    multivitamin w/minerals (THERA-VIT-M) tablet 1 tablet, DAILY    rivaroxaban ANTICOAGULANT (XARELTO) 20 mg, Oral, DAILY    torsemide (DEMADEX) 20 mg, Oral, DAILY      Allergies: Lisinopril and Losartan     Immunization History   Administered Date(s) Administered    COVID-19 MONOVALENT 12+ (Pfizer) 04/20/2021, 05/11/2021    Influenza (IIV3) PF 10/06/2014    Influenza Vaccine, 6+MO IM (QUADRIVALENT W/PRESERVATIVES) 10/06/2014, 09/29/2016, 11/06/2017    TD,PF 7+ (Tenivac) 10/26/2007, 11/14/2022    TDAP (Adacel,Boostrix) 11/11/2011    Td (Adult), Adsorbed 09/06/2001             Alvin Fitzpatrick is a 59 year old male coming in today for:    Chief Complaint   Patient presents with    Physical          1/16/2025    11:49 AM   Additional Questions   Roomed by Jacqui Seth   Accompanied by N/A     He does not have other issues outside the physical to discuss as well.    Patient comes in today for routine physical.    He is approximately 10 years out from gastric sleeve operation.  This was quite successful for him.  He used to be around 400 pounds.  He lost about 200 pounds with the surgery.  He had problems with very severe venous insufficiency with lower extremity sores and ichthyosis.    His weight is stable over the last year but we do want him to keep on pace and try to lose a little bit if he can.    He does have some aches and pains and has had hip replacement in the past.    He has pain in his shoulders bilaterally but right greater than left.  He takes Tylenol for it intermittently.    He did drop something on his big toe and had a fairly large bruise with this.  His toe has deformed and is slow to grow back.  He would like this looked at.    We reviewed his other issues noted in the assessment but not specifically addressed in the HPI above.     Exam:     Wt Readings from Last 3 Encounters:   01/16/25 113.4 kg (250 lb)   01/05/24 113.8 kg (250 lb 14.4 oz)   11/14/22 108 kg (238 lb)     BP Readings from Last  "3 Encounters:   01/16/25 126/70   01/05/24 112/54   11/14/22 122/68     /70   Pulse 77   Temp 97.7  F (36.5  C) (Oral)   Resp 16   Ht 1.626 m (5' 4\")   Wt 113.4 kg (250 lb)   SpO2 99%   BMI 42.91 kg/m     The patient is in no acute distress.  Mood good.  Insight good.  No skin lesions or nodules of concern.  Ears are clear.  Nose is clear.  Throat is clear.  Neck is supple  and there is no thyromegaly. No cervical, epitrochlear or axillary adenopathy.  Heart regular rate and rhythm.  No murmur present.  Lungs clear to auscultation bilaterally.  Respiratory effort is good.  Abdomen is soft, nontender.  No hepatosplenomegaly.  Extremities show 1+ edema with venous stasis changes.  No sores.       Diagnostics:     Office Visit on 01/05/2024   Component Date Value Ref Range Status    WBC Count 01/05/2024 4.3  4.0 - 11.0 10e3/uL Final    RBC Count 01/05/2024 4.25 (L)  4.40 - 5.90 10e6/uL Final    Hemoglobin 01/05/2024 14.5  13.3 - 17.7 g/dL Final    Hematocrit 01/05/2024 43.1  40.0 - 53.0 % Final    MCV 01/05/2024 101 (H)  78 - 100 fL Final    MCH 01/05/2024 34.1 (H)  26.5 - 33.0 pg Final    MCHC 01/05/2024 33.6  31.5 - 36.5 g/dL Final    RDW 01/05/2024 13.1  10.0 - 15.0 % Final    Platelet Count 01/05/2024 173  150 - 450 10e3/uL Final    Sodium 01/05/2024 140  135 - 145 mmol/L Final    Reference intervals for this test were updated on 09/26/2023 to more accurately reflect our healthy population. There may be differences in the flagging of prior results with similar values performed with this method. Interpretation of those prior results can be made in the context of the updated reference intervals.     Potassium 01/05/2024 4.4  3.4 - 5.3 mmol/L Final    Carbon Dioxide (CO2) 01/05/2024 30 (H)  22 - 29 mmol/L Final    Anion Gap 01/05/2024 11  7 - 15 mmol/L Final    Urea Nitrogen 01/05/2024 10.3  6.0 - 20.0 mg/dL Final    Creatinine 01/05/2024 0.71  0.67 - 1.17 mg/dL Final    GFR Estimate 01/05/2024 >90  >60 " mL/min/1.73m2 Final    Calcium 01/05/2024 9.3  8.6 - 10.0 mg/dL Final    Chloride 01/05/2024 99  98 - 107 mmol/L Final    Glucose 01/05/2024 84  70 - 99 mg/dL Final    Alkaline Phosphatase 01/05/2024 97  40 - 150 U/L Final    Reference intervals for this test were updated on 11/14/2023 to more accurately reflect our healthy population. There may be differences in the flagging of prior results with similar values performed with this method. Interpretation of those prior results can be made in the context of the updated reference intervals.    AST 01/05/2024 32  0 - 45 U/L Final    Reference intervals for this test were updated on 6/12/2023 to more accurately reflect our healthy population. There may be differences in the flagging of prior results with similar values performed with this method. Interpretation of those prior results can be made in the context of the updated reference intervals.    ALT 01/05/2024 16  0 - 70 U/L Final    Reference intervals for this test were updated on 6/12/2023 to more accurately reflect our healthy population. There may be differences in the flagging of prior results with similar values performed with this method. Interpretation of those prior results can be made in the context of the updated reference intervals.      Protein Total 01/05/2024 8.1  6.4 - 8.3 g/dL Final    Albumin 01/05/2024 4.3  3.5 - 5.2 g/dL Final    Bilirubin Total 01/05/2024 0.3  <=1.2 mg/dL Final    Uric Acid 01/05/2024 5.2  3.4 - 7.0 mg/dL Final    Prostate Specific Antigen Screen 01/05/2024 0.56  0.00 - 3.50 ng/mL Final    Cholesterol 01/05/2024 205 (H)  <200 mg/dL Final    Triglycerides 01/05/2024 86  <150 mg/dL Final    Direct Measure HDL 01/05/2024 102  >=40 mg/dL Final    LDL Cholesterol Calculated 01/05/2024 86  <=100 mg/dL Final    Non HDL Cholesterol 01/05/2024 103  <130 mg/dL Final    Patient Fasting > 8hrs? 01/05/2024 Unknown   Final    TSH 01/05/2024 6.31 (H)  0.30 - 4.20 uIU/mL Final    N Terminal Pro  BNP Outpatient 01/05/2024 76  0 - 900 pg/mL Final    Reference range shown and results flagged as abnormal are for the outpatient, non acute settings. Establishing a baseline value for each individual patient is useful for follow-up.    Suggested inpatient cut points for confirming diagnosis of CHF in an acute setting are:  >450 pg/mL (age 18 to less than 50)  >900 pg/mL (age 50 to less than 75)  >1800 pg/mL (75 yrs and older)    An inpatient or emergency department NT-proPBNP <300 pg/mL effectively rules out acute CHF, with 99% negative predictive value.            Results for orders placed or performed in visit on 01/16/25   CBC with platelets     Status: Abnormal   Result Value Ref Range    WBC Count 4.3 4.0 - 11.0 10e3/uL    RBC Count 4.17 (L) 4.40 - 5.90 10e6/uL    Hemoglobin 14.4 13.3 - 17.7 g/dL    Hematocrit 42.5 40.0 - 53.0 %     (H) 78 - 100 fL    MCH 34.5 (H) 26.5 - 33.0 pg    MCHC 33.9 31.5 - 36.5 g/dL    RDW 13.1 10.0 - 15.0 %    Platelet Count 200 150 - 450 10e3/uL        Assessment:     1. Routine physical examination    2. PE (pulmonary thromboembolism) (H)    3. Primary hypertension    4. Idiopathic gout of multiple sites, unspecified chronicity    5. Screening for prostate cancer    6. Status post laparoscopic sleeve gastrectomy    7. Venous insufficiency    8. ANASTACIO on CPAP    9. Chronic pain of both shoulders        Plan:     Blood work done today.  Can refill meds for 1 year without being seen.  Generally tolerating well.  He has followed up with sleep medicine and this was reviewed.  Continue current medications otherwise.  Follow up sooner if issues.    Orders Placed This Encounter   Procedures    Basic metabolic panel    TSH    CBC with platelets    Uric acid    Prostate Specific Antigen Screen        The longitudinal plan of care for the diagnoses and conditions as documented were addressed during this visit. Due to the added complexity in care, I will continue to support Alvin or  Mike in the subsequent management and with ongoing continuity of care.        Noe Beckwith MD  General Internal Medicine  [facility]    Return in about 1 year (around 1/16/2026) for physical, visit and blood work.     No future appointments.      Wt Readings from Last 20 Encounters:   01/16/25 113.4 kg (250 lb)   01/05/24 113.8 kg (250 lb 14.4 oz)   11/14/22 108 kg (238 lb)   10/24/22 108.9 kg (240 lb)   10/17/22 109.4 kg (241 lb 3.2 oz)   09/23/22 108 kg (238 lb)   09/13/22 112.1 kg (247 lb 3.2 oz)   02/15/21 107.5 kg (236 lb 14.4 oz)   12/22/20 106.5 kg (234 lb 14.4 oz)   02/10/20 110.1 kg (242 lb 12.8 oz)   05/02/19 108.1 kg (238 lb 4.8 oz)   04/22/19 108.9 kg (240 lb)   01/11/19 109.4 kg (241 lb 3.2 oz)   11/06/17 103.4 kg (227 lb 14.4 oz)   09/06/17 98.9 kg (218 lb)   10/07/16 96.2 kg (212 lb 1.6 oz)   09/29/16 95.7 kg (211 lb)   01/27/16 109.5 kg (241 lb 6.4 oz)   12/14/15 116.1 kg (256 lb)   12/03/15 116.1 kg (256 lb)     BP Readings from Last 20 Encounters:   01/16/25 126/70   01/05/24 112/54   11/14/22 122/68   10/24/22 132/72   10/17/22 135/85   09/23/22 (!) 141/82   09/13/22 139/88   02/15/21 132/60   12/22/20 132/70   02/10/20 136/84   09/06/17 125/69   10/07/16 115/69   01/27/16 106/66   10/02/15 110/68   07/30/15 112/64   07/09/15 117/64   07/01/15 145/78   06/17/15 119/75   06/12/15 140/65   06/12/15 127/70      Pulse Readings from Last 20 Encounters:   01/16/25 77   01/05/24 78   11/14/22 68   10/24/22 67   10/17/22 80   09/23/22 62   09/13/22 67   02/15/21 84   12/22/20 85   02/10/20 83   09/06/17 58   10/07/16 56   01/27/16 62   10/02/15 66   07/30/15 82   07/09/15 76   07/01/15 96   06/17/15 87   06/12/15 77   06/12/15 87     SpO2 Readings from Last 20 Encounters:   01/16/25 99%   01/05/24 97%   10/24/22 98%   10/17/22 97%   09/23/22 100%   09/13/22 98%   02/15/21 95%   12/22/20 95%   02/10/20 100%   09/06/17 100%   10/07/16 98%   01/27/16 98%   10/02/15 100%    07/30/15 99%   07/09/15 99%   07/01/15 92%   06/17/15 98%   06/12/15 100%   06/02/15 96%   05/05/15 97%

## 2025-01-16 NOTE — PATIENT INSTRUCTIONS
"Please follow up if you have any further issues.    You may contact me if you are worsening or if things are not improving.    ______________________________________________________________________     How do I get a hold of Dr. Beckwith or his team more directly?    One option is to leave me a Snap[employer]oint[employer] message about your situation.  Another option is to call our Care Team coordinators.  These are 3 people in our [facility] taking phone calls.  They are available Monday - Friday 7 am - 6 pm.  You can call [phone] and when asked for a voice prompt, say \"CARE TEAM\" to get connected to them.  ______________________________________________________________________     What if I need an appointment with Dr. Beckwith specifically?    Please remember that you can call [phone] ANYTIME to schedule an appointment.  With the voice prompt, say \"APPOINTMENTS.\"    You can schedule appointments 24 hours a day, 7 days a week.      Sometimes the best time to schedule an appointment is after clinic hours or on weekends when less people are calling in.      You can also schedule appointments through [employer].    ______________________________________________________________________     What if I need care more urgently?    If it is an emergency, do not wait for us to respond as it can sometimes take awhile.  Call 911 and go to the hospital.  A number of more urgent care options are available:  Walk-in Care at [place] or [place].  Open in [place] 10 am - 8 pm Monday-Friday.  9 am - 8 pm Saturday and Sunday.  Open in [place] 10 am - 8 pm Monday-Friday.  9 am - 5 pm Saturday and Sunday.  Earlier times tend to be less crowded.  Other local urgent care.  [facility] (in [facility] and [place]).  For more information, [email or web address].  All locations are open from 8am to 9pm daily.  Emergency room especially in the middle of the night.  If your problem is more orthopedic in nature (joint, spine or bone pain), " "there is orthopedic urgent care as well.  [facility] urgent care.    Available locally in [facility], [place] and [place].  Open 8 am to 8 pm every day.  [facility] urgent care.  Available in [place], [place] and [place].  Open 8 am to 8 pm every day.    ______________________________________________________________________     What if I just want some advice from a nurse?    We do have triage nurses available 24-7.  Call [phone] and when asked for a voice prompt, say \"TRIAGE NURSE\"     "

## 2025-01-30 DIAGNOSIS — I26.99 PE (PULMONARY THROMBOEMBOLISM) (H): ICD-10-CM

## 2025-02-04 ENCOUNTER — DOCUMENTATION ONLY (OUTPATIENT)
Dept: ANTICOAGULATION | Facility: CLINIC | Age: 60
End: 2025-02-04
Payer: COMMERCIAL

## 2025-02-04 NOTE — PROGRESS NOTES
Anticoagulant Therapeutic Duplication    Duplicate orders identified: different medication of the same therapeutic class    The duplicate anticoagulant order(s) has been discontinued    Active anticoagulant: rivaroxaban (Xarelto)    Plan made per [facility] anticoagulation protocol.    Maria Ines Tam RN  2/4/2025

## 2025-05-07 ENCOUNTER — MYC MEDICAL ADVICE (OUTPATIENT)
Dept: INTERNAL MEDICINE | Facility: CLINIC | Age: 60
End: 2025-05-07
Payer: COMMERCIAL

## 2025-06-03 ENCOUNTER — MYC MEDICAL ADVICE (OUTPATIENT)
Dept: INTERNAL MEDICINE | Facility: CLINIC | Age: 60
End: 2025-06-03
Payer: COMMERCIAL

## 2025-06-03 DIAGNOSIS — M1A.09X0 CHRONIC GOUT OF MULTIPLE SITES, UNSPECIFIED CAUSE: Primary | ICD-10-CM

## 2025-06-03 RX ORDER — PREDNISONE 20 MG/1
TABLET ORAL
Qty: 7 TABLET | Refills: 0 | Status: SHIPPED | OUTPATIENT
Start: 2025-06-03 | End: 2025-06-08

## 2025-09-02 ENCOUNTER — OFFICE VISIT (OUTPATIENT)
Dept: INTERNAL MEDICINE | Facility: CLINIC | Age: 60
End: 2025-09-02
Payer: COMMERCIAL

## 2025-09-02 VITALS
WEIGHT: 246 LBS | HEART RATE: 84 BPM | SYSTOLIC BLOOD PRESSURE: 124 MMHG | OXYGEN SATURATION: 99 % | RESPIRATION RATE: 16 BRPM | TEMPERATURE: 98.2 F | DIASTOLIC BLOOD PRESSURE: 76 MMHG | BODY MASS INDEX: 42.23 KG/M2

## 2025-09-02 DIAGNOSIS — I26.99 PE (PULMONARY THROMBOEMBOLISM) (H): ICD-10-CM

## 2025-09-02 DIAGNOSIS — F41.9 ANXIETY: ICD-10-CM

## 2025-09-02 DIAGNOSIS — L42 PR (PITYRIASIS ROSEA): Primary | ICD-10-CM

## 2025-09-02 DIAGNOSIS — R21 SKIN RASH: ICD-10-CM

## 2025-09-02 PROCEDURE — 3078F DIAST BP <80 MM HG: CPT | Performed by: INTERNAL MEDICINE

## 2025-09-02 PROCEDURE — G2211 COMPLEX E/M VISIT ADD ON: HCPCS | Performed by: INTERNAL MEDICINE

## 2025-09-02 PROCEDURE — 3074F SYST BP LT 130 MM HG: CPT | Performed by: INTERNAL MEDICINE

## 2025-09-02 PROCEDURE — 99215 OFFICE O/P EST HI 40 MIN: CPT | Performed by: INTERNAL MEDICINE

## 2025-09-02 RX ORDER — CITALOPRAM HYDROBROMIDE 10 MG/1
10 TABLET ORAL DAILY
Qty: 90 TABLET | Refills: 1 | Status: SHIPPED | OUTPATIENT
Start: 2025-09-02

## 2025-09-02 ASSESSMENT — PATIENT HEALTH QUESTIONNAIRE - PHQ9
5. POOR APPETITE OR OVEREATING: SEVERAL DAYS
SUM OF ALL RESPONSES TO PHQ QUESTIONS 1-9: 4

## 2025-09-02 ASSESSMENT — ANXIETY QUESTIONNAIRES
IF YOU CHECKED OFF ANY PROBLEMS ON THIS QUESTIONNAIRE, HOW DIFFICULT HAVE THESE PROBLEMS MADE IT FOR YOU TO DO YOUR WORK, TAKE CARE OF THINGS AT HOME, OR GET ALONG WITH OTHER PEOPLE: SOMEWHAT DIFFICULT
GAD7 TOTAL SCORE: 4
7. FEELING AFRAID AS IF SOMETHING AWFUL MIGHT HAPPEN: SEVERAL DAYS
2. NOT BEING ABLE TO STOP OR CONTROL WORRYING: NOT AT ALL
1. FEELING NERVOUS, ANXIOUS, OR ON EDGE: NOT AT ALL
GAD7 TOTAL SCORE: 4
3. WORRYING TOO MUCH ABOUT DIFFERENT THINGS: SEVERAL DAYS
6. BECOMING EASILY ANNOYED OR IRRITABLE: SEVERAL DAYS
5. BEING SO RESTLESS THAT IT IS HARD TO SIT STILL: NOT AT ALL